# Patient Record
Sex: FEMALE | Race: WHITE | NOT HISPANIC OR LATINO | Employment: OTHER | URBAN - METROPOLITAN AREA
[De-identification: names, ages, dates, MRNs, and addresses within clinical notes are randomized per-mention and may not be internally consistent; named-entity substitution may affect disease eponyms.]

---

## 2017-05-03 ENCOUNTER — TELEPHONE (OUTPATIENT)
Dept: TRANSPLANT | Facility: CLINIC | Age: 62
End: 2017-05-03

## 2017-05-03 NOTE — TELEPHONE ENCOUNTER
Leydi's recip is going to [facility]. Didn't know if she should have clicked [facility] or [facility] since she was closer to [facility]. Will now cont process with Em.

## 2017-05-03 NOTE — TELEPHONE ENCOUNTER
"Logged in as: [email or web address]. Logout.  Incomplete   Pending   Registered   Archived Change Log Done Living Kidney Donor Evaluation Completed: May 03, 2017 09:16:27 CT Updated: May 03, 2017 09:17:13 CT  Donor Name: Leydi Atkinson MRN: [id number] Note: : 1955 Age: 61Gender: Female Donor Height: 5  4\" Weight (lb): 200 BMI: 34.3  Donor Race:  Ethnicity: Not / Donor Preferred Language: English  Required?: No Current Marital Status:   Demographics: Home Address: [address] [place] [address] City: [place] State: [place] Zip: [address] Country: [place]  Best Phone: [phone] Alt Phone: [phone] Donor Email: [email or web address] Best Phone Type: Mobile Alt Phone Type:   Preferred Contact Time(s): 09:00 AM-11:00 AM, 11:00 AM-1:00 PM, 1:00 PM-4:00 PM Preferred Contact Day(s): Mon, Tue, Wed, Thur, Fri  Donor Screen: PASSED Donor Referred by: Tx Candidate Donor self reported ABO: A  Recipient Information: Recipient Name (Last, First): Tana Currie Recipient :    1952  ... Donor Relationship: Close friend Recipient Diagnosis: Recipient ABO:   MEDICAL HISTORY:  Attention Deficit Hyperactivity Disorder (ADHD)  Depression  Endometriosis  History of miscarriage  History of pregnancy  Insomnia  Menopause  Stress Tests, Normal   Uterine Fibroids  MEDICATIONS:  Bupropion Extended-Release  Citalopram  Multivitamin with Iron  Trazodone  SURGICAL HISTORY:  Diagnostic Laparotomy/Laparoscopy, NOS  Dilation and Curettage  Myomectomy, NOS  Tubal Ligation  ALLERGIES:  Sulfa Drugs : Rash  SOCIAL HISTORY:  EtOH: Rare (1-2 drinks/month)  Illicit Drug Use: Denies  Tobacco: Denies  SELF-REPORTED FUNCTIONAL STATUS:  \"I am able to participate in moderate recreational activities like golf, double tennis, dancing, throwing a baseball or football\"  Exercise (2 X per week)  REVIEW OF ORGAN SYSTEMS: Airway or Lungs: No Blood Disorder: No Cancer: No Diabetes,Thyroid,Adrenal,Endocrine Disorder: No " Digestive or Liver: No Female Health: Yes Heart or Circulatory System: No Immune Diseases: No Kidneys and Bladder: No Muscles,Bones,Joints: No Neuro: No Psych: Yes  FAMILY HISTORY: Confirmed:  Hypertension (Father, Mother)  Denied:  Cancer (denies)  Diabetes (denies)  Heart Disease (denies)  Kidney Disease (denies)  Kidney Stones (denies)  DONOR INFORMATION:  Level of Education: Graduate or professional degree complete Employment Status: Full Time Employer: [employer] Medical Insurance Status: Has medical insurance Current Accommodation: Owns own home/apartment Living Arrangement: With relatives other than spouse, parents Allow Disclosure to Recipient: Yes Paired Kidney Exchange Education Level: Has learned about Paired Kidney Exchange Paired Kidney Exchange Participation Consent: No Donor Motivation: Highly motivated donor  HIGH RISK BEHAVIOR:  Blood transfusion < 12 months. (NO)  Commercial sex < 12 months. (NO)  Illicit IV drug use < 5yrs. (NO)  Other high risk sexual contact < 12 months. (NO)  EMERGENCY CONTACT INFORMATION:  Primary Secondary First Name: Ricardo Last Name: Magdalena Phone Number: [phone] Relationship: Friend or Other  First Name: Robb  Last Name: Jv Phone Number: [phone] Relationship: Friend or Other  REASON FOR DONATION:   A dear friend needs a kidney ASAP  PHYSICIAN CONTACT INFORMATION:  PCP  Name: [facility]: [place] State: [place]  Phone: [phone]  ADDITIONAL NOTES:   REVIEWED BY:    Ashtyn  TODAY'S DATE:   05/03/2017  ... Done  A dear friend needs a kidney ASAP

## 2017-05-27 ENCOUNTER — HEALTH MAINTENANCE LETTER (OUTPATIENT)
Age: 62
End: 2017-05-27

## 2019-09-28 ENCOUNTER — HEALTH MAINTENANCE LETTER (OUTPATIENT)
Age: 64
End: 2019-09-28

## 2021-01-10 ENCOUNTER — HEALTH MAINTENANCE LETTER (OUTPATIENT)
Age: 66
End: 2021-01-10

## 2021-10-23 ENCOUNTER — HEALTH MAINTENANCE LETTER (OUTPATIENT)
Age: 66
End: 2021-10-23

## 2022-02-12 ENCOUNTER — HEALTH MAINTENANCE LETTER (OUTPATIENT)
Age: 67
End: 2022-02-12

## 2022-02-16 ENCOUNTER — APPOINTMENT (OUTPATIENT)
Dept: MRI IMAGING | Facility: CLINIC | Age: 67
End: 2022-02-16
Attending: EMERGENCY MEDICINE
Payer: COMMERCIAL

## 2022-02-16 ENCOUNTER — HOSPITAL ENCOUNTER (EMERGENCY)
Facility: CLINIC | Age: 67
Discharge: ANOTHER HOSPITAL | End: 2022-02-17
Attending: EMERGENCY MEDICINE | Admitting: EMERGENCY MEDICINE
Payer: COMMERCIAL

## 2022-02-16 DIAGNOSIS — E83.42 HYPOMAGNESEMIA: ICD-10-CM

## 2022-02-16 DIAGNOSIS — E87.6 HYPOKALEMIA: ICD-10-CM

## 2022-02-16 DIAGNOSIS — I63.511 ACUTE STROKE DUE TO OCCLUSION OF RIGHT MIDDLE CEREBRAL ARTERY (H): ICD-10-CM

## 2022-02-16 PROBLEM — C80.1 MALIGNANT NEOPLASM (H): Status: ACTIVE | Noted: 2019-02-14

## 2022-02-16 PROBLEM — Z85.3 PERSONAL HISTORY OF MALIGNANT NEOPLASM OF BREAST: Status: ACTIVE | Noted: 2020-08-21

## 2022-02-16 LAB
ALBUMIN SERPL-MCNC: 3.5 G/DL (ref 3.5–5)
ALP SERPL-CCNC: 73 U/L (ref 45–120)
ALT SERPL W P-5'-P-CCNC: 22 U/L (ref 0–45)
ANION GAP SERPL CALCULATED.3IONS-SCNC: 11 MMOL/L (ref 5–18)
AST SERPL W P-5'-P-CCNC: 20 U/L (ref 0–40)
ATRIAL RATE - MUSE: 95 BPM
BASOPHILS # BLD AUTO: 0.1 10E3/UL (ref 0–0.2)
BASOPHILS NFR BLD AUTO: 2 %
BILIRUB SERPL-MCNC: 0.5 MG/DL (ref 0–1)
BUN SERPL-MCNC: 5 MG/DL (ref 8–22)
CALCIUM SERPL-MCNC: 8.7 MG/DL (ref 8.5–10.5)
CHLORIDE BLD-SCNC: 103 MMOL/L (ref 98–107)
CO2 SERPL-SCNC: 25 MMOL/L (ref 22–31)
CREAT SERPL-MCNC: 0.84 MG/DL (ref 0.6–1.1)
DIASTOLIC BLOOD PRESSURE - MUSE: NORMAL MMHG
EOSINOPHIL # BLD AUTO: 0 10E3/UL (ref 0–0.7)
EOSINOPHIL NFR BLD AUTO: 1 %
ERYTHROCYTE [DISTWIDTH] IN BLOOD BY AUTOMATED COUNT: 14.5 % (ref 10–15)
GFR SERPL CREATININE-BSD FRML MDRD: 77 ML/MIN/1.73M2
GLUCOSE BLD-MCNC: 105 MG/DL (ref 70–125)
HCT VFR BLD AUTO: 37.1 % (ref 35–47)
HGB BLD-MCNC: 13.2 G/DL (ref 11.7–15.7)
IMM GRANULOCYTES # BLD: 0 10E3/UL
IMM GRANULOCYTES NFR BLD: 1 %
INR PPP: 1.63 (ref 0.85–1.15)
INTERPRETATION ECG - MUSE: NORMAL
LYMPHOCYTES # BLD AUTO: 1.3 10E3/UL (ref 0.8–5.3)
LYMPHOCYTES NFR BLD AUTO: 35 %
MAGNESIUM SERPL-MCNC: 1.6 MG/DL (ref 1.8–2.6)
MCH RBC QN AUTO: 36.8 PG (ref 26.5–33)
MCHC RBC AUTO-ENTMCNC: 35.6 G/DL (ref 31.5–36.5)
MCV RBC AUTO: 103 FL (ref 78–100)
MONOCYTES # BLD AUTO: 0.6 10E3/UL (ref 0–1.3)
MONOCYTES NFR BLD AUTO: 15 %
NEUTROPHILS # BLD AUTO: 1.8 10E3/UL (ref 1.6–8.3)
NEUTROPHILS NFR BLD AUTO: 46 %
NRBC # BLD AUTO: 0 10E3/UL
NRBC BLD AUTO-RTO: 0 /100
P AXIS - MUSE: 56 DEGREES
PLATELET # BLD AUTO: 296 10E3/UL (ref 150–450)
POTASSIUM BLD-SCNC: 3.1 MMOL/L (ref 3.5–5)
PR INTERVAL - MUSE: 182 MS
PROT SERPL-MCNC: 6.9 G/DL (ref 6–8)
QRS DURATION - MUSE: 90 MS
QT - MUSE: 404 MS
QTC - MUSE: 507 MS
R AXIS - MUSE: -29 DEGREES
RBC # BLD AUTO: 3.59 10E6/UL (ref 3.8–5.2)
SODIUM SERPL-SCNC: 139 MMOL/L (ref 136–145)
SYSTOLIC BLOOD PRESSURE - MUSE: NORMAL MMHG
T AXIS - MUSE: 65 DEGREES
TROPONIN I SERPL-MCNC: 0.01 NG/ML (ref 0–0.29)
VENTRICULAR RATE- MUSE: 95 BPM
WBC # BLD AUTO: 3.9 10E3/UL (ref 4–11)

## 2022-02-16 PROCEDURE — 85025 COMPLETE CBC W/AUTO DIFF WBC: CPT | Performed by: EMERGENCY MEDICINE

## 2022-02-16 PROCEDURE — 70549 MR ANGIOGRAPH NECK W/O&W/DYE: CPT

## 2022-02-16 PROCEDURE — 83735 ASSAY OF MAGNESIUM: CPT | Performed by: EMERGENCY MEDICINE

## 2022-02-16 PROCEDURE — 99285 EMERGENCY DEPT VISIT HI MDM: CPT | Mod: 25

## 2022-02-16 PROCEDURE — 84484 ASSAY OF TROPONIN QUANT: CPT | Performed by: EMERGENCY MEDICINE

## 2022-02-16 PROCEDURE — 80053 COMPREHEN METABOLIC PANEL: CPT | Performed by: EMERGENCY MEDICINE

## 2022-02-16 PROCEDURE — 36415 COLL VENOUS BLD VENIPUNCTURE: CPT | Performed by: EMERGENCY MEDICINE

## 2022-02-16 PROCEDURE — 70544 MR ANGIOGRAPHY HEAD W/O DYE: CPT | Mod: XS

## 2022-02-16 PROCEDURE — 96375 TX/PRO/DX INJ NEW DRUG ADDON: CPT | Mod: 59

## 2022-02-16 PROCEDURE — 250N000011 HC RX IP 250 OP 636: Performed by: EMERGENCY MEDICINE

## 2022-02-16 PROCEDURE — C9803 HOPD COVID-19 SPEC COLLECT: HCPCS

## 2022-02-16 PROCEDURE — 85610 PROTHROMBIN TIME: CPT | Performed by: EMERGENCY MEDICINE

## 2022-02-16 PROCEDURE — 93005 ELECTROCARDIOGRAM TRACING: CPT | Performed by: EMERGENCY MEDICINE

## 2022-02-16 PROCEDURE — 70553 MRI BRAIN STEM W/O & W/DYE: CPT

## 2022-02-16 PROCEDURE — 250N000013 HC RX MED GY IP 250 OP 250 PS 637: Performed by: EMERGENCY MEDICINE

## 2022-02-16 PROCEDURE — 96365 THER/PROPH/DIAG IV INF INIT: CPT

## 2022-02-16 RX ORDER — MAGNESIUM SULFATE HEPTAHYDRATE 40 MG/ML
2 INJECTION, SOLUTION INTRAVENOUS ONCE
Status: COMPLETED | OUTPATIENT
Start: 2022-02-16 | End: 2022-02-17

## 2022-02-16 RX ORDER — LORAZEPAM 2 MG/ML
1 INJECTION INTRAMUSCULAR ONCE
Status: COMPLETED | OUTPATIENT
Start: 2022-02-16 | End: 2022-02-16

## 2022-02-16 RX ORDER — POTASSIUM CHLORIDE 1500 MG/1
40 TABLET, EXTENDED RELEASE ORAL ONCE
Status: COMPLETED | OUTPATIENT
Start: 2022-02-16 | End: 2022-02-16

## 2022-02-16 RX ORDER — GADOBUTROL 604.72 MG/ML
9.5 INJECTION INTRAVENOUS ONCE
Status: COMPLETED | OUTPATIENT
Start: 2022-02-17 | End: 2022-02-17

## 2022-02-16 RX ADMIN — LORAZEPAM 1 MG: 2 INJECTION INTRAMUSCULAR; INTRAVENOUS at 23:09

## 2022-02-16 RX ADMIN — POTASSIUM CHLORIDE 40 MEQ: 1500 TABLET, EXTENDED RELEASE ORAL at 23:10

## 2022-02-17 ENCOUNTER — HOSPITAL ENCOUNTER (INPATIENT)
Facility: CLINIC | Age: 67
LOS: 3 days | Discharge: HOME | DRG: 064 | End: 2022-02-20
Attending: INTERNAL MEDICINE | Admitting: STUDENT IN AN ORGANIZED HEALTH CARE EDUCATION/TRAINING PROGRAM
Payer: COMMERCIAL

## 2022-02-17 ENCOUNTER — APPOINTMENT (OUTPATIENT)
Dept: PHYSICAL THERAPY | Facility: CLINIC | Age: 67
DRG: 064 | End: 2022-02-17
Attending: STUDENT IN AN ORGANIZED HEALTH CARE EDUCATION/TRAINING PROGRAM
Payer: COMMERCIAL

## 2022-02-17 ENCOUNTER — APPOINTMENT (OUTPATIENT)
Dept: CARDIOLOGY | Facility: CLINIC | Age: 67
DRG: 064 | End: 2022-02-17
Attending: PHYSICIAN ASSISTANT
Payer: COMMERCIAL

## 2022-02-17 ENCOUNTER — APPOINTMENT (OUTPATIENT)
Dept: SPEECH THERAPY | Facility: CLINIC | Age: 67
DRG: 064 | End: 2022-02-17
Attending: STUDENT IN AN ORGANIZED HEALTH CARE EDUCATION/TRAINING PROGRAM
Payer: COMMERCIAL

## 2022-02-17 ENCOUNTER — APPOINTMENT (OUTPATIENT)
Dept: OCCUPATIONAL THERAPY | Facility: CLINIC | Age: 67
DRG: 064 | End: 2022-02-17
Attending: STUDENT IN AN ORGANIZED HEALTH CARE EDUCATION/TRAINING PROGRAM
Payer: COMMERCIAL

## 2022-02-17 ENCOUNTER — APPOINTMENT (OUTPATIENT)
Dept: CT IMAGING | Facility: CLINIC | Age: 67
DRG: 064 | End: 2022-02-17
Attending: STUDENT IN AN ORGANIZED HEALTH CARE EDUCATION/TRAINING PROGRAM
Payer: COMMERCIAL

## 2022-02-17 VITALS
SYSTOLIC BLOOD PRESSURE: 123 MMHG | BODY MASS INDEX: 36.37 KG/M2 | TEMPERATURE: 97.9 F | RESPIRATION RATE: 16 BRPM | DIASTOLIC BLOOD PRESSURE: 67 MMHG | OXYGEN SATURATION: 97 % | WEIGHT: 213 LBS | HEART RATE: 106 BPM | HEIGHT: 64 IN

## 2022-02-17 DIAGNOSIS — I63.9 ACUTE STROKE DUE TO ISCHEMIA (H): Primary | ICD-10-CM

## 2022-02-17 PROBLEM — E83.42 HYPOMAGNESEMIA: Status: ACTIVE | Noted: 2022-02-17

## 2022-02-17 PROBLEM — I63.511: Status: ACTIVE | Noted: 2022-02-17

## 2022-02-17 PROBLEM — E87.6 HYPOKALEMIA: Status: ACTIVE | Noted: 2022-02-17

## 2022-02-17 LAB
CHOLEST SERPL-MCNC: 178 MG/DL
GLUCOSE BLDC GLUCOMTR-MCNC: 103 MG/DL (ref 70–99)
GLUCOSE BLDC GLUCOMTR-MCNC: 90 MG/DL (ref 70–99)
GLUCOSE BLDC GLUCOMTR-MCNC: 91 MG/DL (ref 70–99)
GLUCOSE BLDC GLUCOMTR-MCNC: 92 MG/DL (ref 70–99)
GLUCOSE BLDC GLUCOMTR-MCNC: 95 MG/DL (ref 70–99)
HBA1C MFR BLD: 5 % (ref 0–5.6)
HDLC SERPL-MCNC: 31 MG/DL
LDLC SERPL CALC-MCNC: 113 MG/DL
LVEF ECHO: NORMAL
MAGNESIUM SERPL-MCNC: 2.1 MG/DL (ref 1.8–2.6)
MAGNESIUM SERPL-MCNC: 2.4 MG/DL (ref 1.6–2.3)
NONHDLC SERPL-MCNC: 147 MG/DL
POTASSIUM BLD-SCNC: 2.9 MMOL/L (ref 3.4–5.3)
POTASSIUM BLD-SCNC: 3.8 MMOL/L (ref 3.4–5.3)
SARS-COV-2 RNA RESP QL NAA+PROBE: NEGATIVE
TRIGL SERPL-MCNC: 168 MG/DL
TROPONIN I SERPL HS-MCNC: 8 NG/L

## 2022-02-17 PROCEDURE — 99223 1ST HOSP IP/OBS HIGH 75: CPT | Performed by: PSYCHIATRY & NEUROLOGY

## 2022-02-17 PROCEDURE — 36415 COLL VENOUS BLD VENIPUNCTURE: CPT | Performed by: HOSPITALIST

## 2022-02-17 PROCEDURE — 93306 TTE W/DOPPLER COMPLETE: CPT

## 2022-02-17 PROCEDURE — 97112 NEUROMUSCULAR REEDUCATION: CPT | Mod: GP | Performed by: PHYSICAL THERAPIST

## 2022-02-17 PROCEDURE — 250N000013 HC RX MED GY IP 250 OP 250 PS 637: Performed by: HOSPITALIST

## 2022-02-17 PROCEDURE — 70498 CT ANGIOGRAPHY NECK: CPT

## 2022-02-17 PROCEDURE — 84484 ASSAY OF TROPONIN QUANT: CPT | Performed by: STUDENT IN AN ORGANIZED HEALTH CARE EDUCATION/TRAINING PROGRAM

## 2022-02-17 PROCEDURE — 120N000001 HC R&B MED SURG/OB

## 2022-02-17 PROCEDURE — 250N000009 HC RX 250: Performed by: HOSPITALIST

## 2022-02-17 PROCEDURE — 83735 ASSAY OF MAGNESIUM: CPT | Performed by: HOSPITALIST

## 2022-02-17 PROCEDURE — 250N000013 HC RX MED GY IP 250 OP 250 PS 637: Performed by: PHYSICIAN ASSISTANT

## 2022-02-17 PROCEDURE — 97166 OT EVAL MOD COMPLEX 45 MIN: CPT | Mod: GO | Performed by: OCCUPATIONAL THERAPIST

## 2022-02-17 PROCEDURE — 36415 COLL VENOUS BLD VENIPUNCTURE: CPT | Performed by: STUDENT IN AN ORGANIZED HEALTH CARE EDUCATION/TRAINING PROGRAM

## 2022-02-17 PROCEDURE — 87635 SARS-COV-2 COVID-19 AMP PRB: CPT | Performed by: EMERGENCY MEDICINE

## 2022-02-17 PROCEDURE — 83735 ASSAY OF MAGNESIUM: CPT | Performed by: STUDENT IN AN ORGANIZED HEALTH CARE EDUCATION/TRAINING PROGRAM

## 2022-02-17 PROCEDURE — 999N000208 ECHOCARDIOGRAM COMPLETE

## 2022-02-17 PROCEDURE — 250N000011 HC RX IP 250 OP 636: Performed by: EMERGENCY MEDICINE

## 2022-02-17 PROCEDURE — 92610 EVALUATE SWALLOWING FUNCTION: CPT | Mod: GN | Performed by: SPEECH-LANGUAGE PATHOLOGIST

## 2022-02-17 PROCEDURE — 255N000002 HC RX 255 OP 636: Performed by: EMERGENCY MEDICINE

## 2022-02-17 PROCEDURE — 97535 SELF CARE MNGMENT TRAINING: CPT | Mod: GO | Performed by: OCCUPATIONAL THERAPIST

## 2022-02-17 PROCEDURE — 84132 ASSAY OF SERUM POTASSIUM: CPT | Performed by: STUDENT IN AN ORGANIZED HEALTH CARE EDUCATION/TRAINING PROGRAM

## 2022-02-17 PROCEDURE — 93306 TTE W/DOPPLER COMPLETE: CPT | Mod: 26 | Performed by: INTERNAL MEDICINE

## 2022-02-17 PROCEDURE — 97161 PT EVAL LOW COMPLEX 20 MIN: CPT | Mod: GP | Performed by: PHYSICAL THERAPIST

## 2022-02-17 PROCEDURE — 92526 ORAL FUNCTION THERAPY: CPT | Mod: GN | Performed by: SPEECH-LANGUAGE PATHOLOGIST

## 2022-02-17 PROCEDURE — 80061 LIPID PANEL: CPT | Performed by: STUDENT IN AN ORGANIZED HEALTH CARE EDUCATION/TRAINING PROGRAM

## 2022-02-17 PROCEDURE — 97116 GAIT TRAINING THERAPY: CPT | Mod: GP | Performed by: PHYSICAL THERAPIST

## 2022-02-17 PROCEDURE — 83036 HEMOGLOBIN GLYCOSYLATED A1C: CPT | Performed by: STUDENT IN AN ORGANIZED HEALTH CARE EDUCATION/TRAINING PROGRAM

## 2022-02-17 PROCEDURE — A9585 GADOBUTROL INJECTION: HCPCS | Performed by: EMERGENCY MEDICINE

## 2022-02-17 PROCEDURE — 84132 ASSAY OF SERUM POTASSIUM: CPT | Performed by: HOSPITALIST

## 2022-02-17 PROCEDURE — 70496 CT ANGIOGRAPHY HEAD: CPT

## 2022-02-17 PROCEDURE — 250N000013 HC RX MED GY IP 250 OP 250 PS 637: Performed by: EMERGENCY MEDICINE

## 2022-02-17 PROCEDURE — 250N000011 HC RX IP 250 OP 636: Performed by: HOSPITALIST

## 2022-02-17 PROCEDURE — 99223 1ST HOSP IP/OBS HIGH 75: CPT | Mod: AI | Performed by: STUDENT IN AN ORGANIZED HEALTH CARE EDUCATION/TRAINING PROGRAM

## 2022-02-17 RX ORDER — ONDANSETRON 4 MG/1
4 TABLET, ORALLY DISINTEGRATING ORAL EVERY 6 HOURS PRN
Status: DISCONTINUED | OUTPATIENT
Start: 2022-02-17 | End: 2022-02-20 | Stop reason: HOSPADM

## 2022-02-17 RX ORDER — POTASSIUM CHLORIDE 1500 MG/1
20 TABLET, EXTENDED RELEASE ORAL DAILY
COMMUNITY

## 2022-02-17 RX ORDER — LETROZOLE 2.5 MG/1
2.5 TABLET, FILM COATED ORAL DAILY
COMMUNITY

## 2022-02-17 RX ORDER — GABAPENTIN 300 MG/1
300 CAPSULE ORAL 2 TIMES DAILY
COMMUNITY

## 2022-02-17 RX ORDER — POTASSIUM CHLORIDE 1500 MG/1
20 TABLET, EXTENDED RELEASE ORAL DAILY
Status: ON HOLD | COMMUNITY
End: 2022-02-17

## 2022-02-17 RX ORDER — POTASSIUM CHLORIDE 1.5 G/1.58G
40 POWDER, FOR SOLUTION ORAL ONCE
Status: COMPLETED | OUTPATIENT
Start: 2022-02-17 | End: 2022-02-17

## 2022-02-17 RX ORDER — CLOPIDOGREL BISULFATE 75 MG/1
300 TABLET ORAL ONCE
Status: COMPLETED | OUTPATIENT
Start: 2022-02-17 | End: 2022-02-17

## 2022-02-17 RX ORDER — CLOPIDOGREL BISULFATE 75 MG/1
75 TABLET ORAL DAILY
Status: DISCONTINUED | OUTPATIENT
Start: 2022-02-18 | End: 2022-02-19

## 2022-02-17 RX ORDER — POTASSIUM CHLORIDE 7.45 MG/ML
10 INJECTION INTRAVENOUS
Status: DISCONTINUED | OUTPATIENT
Start: 2022-02-17 | End: 2022-02-17

## 2022-02-17 RX ORDER — ONDANSETRON 2 MG/ML
4 INJECTION INTRAMUSCULAR; INTRAVENOUS EVERY 6 HOURS PRN
Status: DISCONTINUED | OUTPATIENT
Start: 2022-02-17 | End: 2022-02-20 | Stop reason: HOSPADM

## 2022-02-17 RX ORDER — POTASSIUM CHLORIDE 1.5 G/1.58G
20 POWDER, FOR SOLUTION ORAL ONCE
Status: COMPLETED | OUTPATIENT
Start: 2022-02-17 | End: 2022-02-17

## 2022-02-17 RX ORDER — LIDOCAINE 40 MG/G
CREAM TOPICAL
Status: DISCONTINUED | OUTPATIENT
Start: 2022-02-17 | End: 2022-02-20 | Stop reason: HOSPADM

## 2022-02-17 RX ORDER — HYDROXYZINE HYDROCHLORIDE 25 MG/1
25 TABLET, FILM COATED ORAL
Status: DISCONTINUED | OUTPATIENT
Start: 2022-02-17 | End: 2022-02-20 | Stop reason: HOSPADM

## 2022-02-17 RX ORDER — BUPROPION HYDROCHLORIDE 150 MG/1
150 TABLET ORAL DAILY PRN
COMMUNITY

## 2022-02-17 RX ORDER — ATORVASTATIN CALCIUM 80 MG/1
80 TABLET, FILM COATED ORAL EVERY EVENING
Status: DISCONTINUED | OUTPATIENT
Start: 2022-02-17 | End: 2022-02-20 | Stop reason: HOSPADM

## 2022-02-17 RX ORDER — ASPIRIN 81 MG/1
81 TABLET ORAL DAILY
Status: DISCONTINUED | OUTPATIENT
Start: 2022-02-17 | End: 2022-02-19

## 2022-02-17 RX ORDER — LANOLIN ALCOHOL/MO/W.PET/CERES
3 CREAM (GRAM) TOPICAL
Status: DISCONTINUED | OUTPATIENT
Start: 2022-02-17 | End: 2022-02-20 | Stop reason: HOSPADM

## 2022-02-17 RX ORDER — TRAZODONE HYDROCHLORIDE 100 MG/1
100 TABLET ORAL
COMMUNITY

## 2022-02-17 RX ORDER — VENLAFAXINE HYDROCHLORIDE 75 MG/1
150 CAPSULE, EXTENDED RELEASE ORAL DAILY
COMMUNITY

## 2022-02-17 RX ORDER — ASPIRIN 325 MG
325 TABLET ORAL ONCE
Status: COMPLETED | OUTPATIENT
Start: 2022-02-17 | End: 2022-02-17

## 2022-02-17 RX ORDER — IOPAMIDOL 755 MG/ML
70 INJECTION, SOLUTION INTRAVASCULAR ONCE
Status: COMPLETED | OUTPATIENT
Start: 2022-02-17 | End: 2022-02-17

## 2022-02-17 RX ADMIN — MAGNESIUM SULFATE HEPTAHYDRATE 2 G: 40 INJECTION, SOLUTION INTRAVENOUS at 00:47

## 2022-02-17 RX ADMIN — GADOBUTROL 9.5 ML: 604.72 INJECTION INTRAVENOUS at 00:16

## 2022-02-17 RX ADMIN — ASPIRIN 325 MG ORAL TABLET 325 MG: 325 PILL ORAL at 01:36

## 2022-02-17 RX ADMIN — IOPAMIDOL 70 ML: 755 INJECTION, SOLUTION INTRAVENOUS at 08:30

## 2022-02-17 RX ADMIN — POTASSIUM CHLORIDE 40 MEQ: 1.5 POWDER, FOR SOLUTION ORAL at 10:53

## 2022-02-17 RX ADMIN — ATORVASTATIN CALCIUM 80 MG: 80 TABLET, FILM COATED ORAL at 20:35

## 2022-02-17 RX ADMIN — ASPIRIN 81 MG: 81 TABLET, COATED ORAL at 13:23

## 2022-02-17 RX ADMIN — POTASSIUM CHLORIDE 20 MEQ: 1.5 POWDER, FOR SOLUTION ORAL at 13:22

## 2022-02-17 RX ADMIN — CLOPIDOGREL BISULFATE 300 MG: 75 TABLET ORAL at 13:23

## 2022-02-17 RX ADMIN — SODIUM CHLORIDE 90 ML: 900 INJECTION INTRAVENOUS at 08:30

## 2022-02-17 ASSESSMENT — ACTIVITIES OF DAILY LIVING (ADL)
ADLS_ACUITY_SCORE: 12
PREVIOUS_RESPONSIBILITIES: DRIVING;MEAL PREP;HOUSEKEEPING;LAUNDRY;SHOPPING;MEDICATION MANAGEMENT;FINANCES
ADLS_ACUITY_SCORE: 12

## 2022-02-17 NOTE — PROGRESS NOTES
[facility]    Medicine Progress Note - Hospitalist Service    Date of Admission:  2/17/2022    Assessment & Plan     Assessment & Plan     Leydi Atkinson is a 66 year old female with past medical history significant for breast cancer and prior pulmonary embolism. admitted on 2/17/2022 with acute stroke.      Acute ischemic stroke of the right MCA territory  Suspected right ICA dissection  Pt presented to the ED with >24 hours of slurred speech, balance issues and left sided facial droop. MRI obtained and showed R superior MCA acute infarction. MRA of the neck showed long segment stenosis of the R ICA from its origin to the base of the skill and MRA of the brain showed R distal MCA occlusion.  -- Stroke consulted - appreciated  --Recommendation for aspirin Plavix for the next 2 to 4 days due to the size of the stroke.  --CT chest abdomen and pelvis to look for evidence of malignancy.  --Echo with bubble study  --Neurology recommendations to consult vascular medicine due to patient having a history of DVT PE on Xarelto, now presenting with stroke.  Asking for any further hypercoagulability work-up that needs to be done.  --Continue atorvastatin 80 mg.  -- Continue cardiac monitor for 30 days on discharge.   -- Neurochecks and Vital Signs every 4 hours   --Plan on PTA Xarelto for now per neuro recs.      Hypokalemia  Hypomagnesemia   Potassium 3.1, magnesium 1.6 on admission.   - Replace per protocol    Elevated INR  INR = 1.63 (Ref range: 0.85 - 1.15) Possibly nutritional.   -- will consider giving vitamin K if not improving.   - Monitor      Hx of breast cancer   T1c, N3, M0 infiltrating ductal carcinoma of the right breast, ER/AL positive, her-2 nicolas negative. SP mastectomy, chemotherapy and radiation treatments. She is currently enrolled in the JOEL trial.  - She remains on Letrozole 2.5mg daily and study drug (ribociclib) 400mg BID - hold while admitted to the hospital.      Hx of  "pulmonary Embolism (4/2021)   - Continue Rivaroxaban 20mg daily     Hx of Monoclonal B cell lymphocytosis, lambda restricted.   Extensive lab testing at [facility] detected 3% monoclonal B cell lymphocyte  - Not currently on any treatment for this.      Obesity: Estimated body mass index is 36.56 kg/m  as calculated from the following:    Height as of 2/16/22: 1.626 m (5' 4\").    Weight as of 2/16/22: 96.6 kg (213 lb)       Diet: Regular Diet Adult    DVT Prophylaxis: Pneumatic Compression Devices  Vera Catheter: Not present  Central Lines: None  Cardiac Monitoring: ACTIVE order. Indication: Stroke, acute (48 hours)  Code Status: Full Code      Disposition Plan   Expected Discharge: 02/18/2022     Anticipated discharge location:  Awaiting care coordination huddle  Delays:            The patient's care was discussed with the Patient.    Gail Livingston MD  Hospitalist Service  [facility]  Securely message with the Vocera Web Console (learn more here)  Text page via [facility] Paging/Directory         Clinically Significant Risk Factors Present on Admission        # Hypokalemia: K = 2.9 mmol/L (Ref range: 3.4 - 5.3 mmol/L) on admission, will replace as needed       # Coagulation Defect: home medication list includes an anticoagulant medication    # Obesity: Estimated body mass index is 36.56 kg/m  as calculated from the following:    Height as of 2/16/22: 1.626 m (5' 4\").    Weight as of 2/16/22: 96.6 kg (213 lb).      ______________________________________________________________________    Interval History   No complaints    Data reviewed today: I reviewed all medications, new labs and imaging results over the last 24 hours. I personally reviewed no images or EKG's today.    Physical Exam   Vital Signs: Temp: 98.6  F (37  C) Temp src: Oral BP: 109/53 Pulse: 91   Resp: 16 SpO2: 95 % O2 Device: None (Room air)    Weight: 0 lbs 0 oz  General Appearance: Well appearing for stated age.  Respiratory: " CTAB, no rales or ronchi  Cardiovascular: S1, S2 normal, no murmurs  GI: non-tender on palpation, BS present  Neuro:     Data   Recent Labs   Lab 02/17/22  1147 02/17/22  0846 02/17/22  0634 02/17/22  0435 02/16/22  2204   WBC  --   --   --   --  3.9*   HGB  --   --   --   --  13.2   MCV  --   --   --   --  103*   PLT  --   --   --   --  296   INR  --   --   --   --  1.63*   NA  --   --   --   --  139   POTASSIUM  --   --   --  2.9* 3.1*   CHLORIDE  --   --   --   --  103   CO2  --   --   --   --  25   BUN  --   --   --   --  5*   CR  --   --   --   --  0.84   ANIONGAP  --   --   --   --  11   NATALIE  --   --   --   --  8.7   GLC 92 95 91  --  105   ALBUMIN  --   --   --   --  3.5   PROTTOTAL  --   --   --   --  6.9   BILITOTAL  --   --   --   --  0.5   ALKPHOS  --   --   --   --  73   ALT  --   --   --   --  22   AST  --   --   --   --  20

## 2022-02-17 NOTE — ED PROVIDER NOTES
EMERGENCY DEPARTMENT ENCOUNTER      NAME: Leydi Atkinson  AGE: 66 year old female  YOB: 1955  MRN: [id number]  EVALUATION DATE & TIME: No admission date for patient encounter.    [facility]    ED PROVIDER: Neymar Collins M.D.      Chief Complaint   Patient presents with     Stroke Symptoms         FINAL IMPRESSION:  Acute right MCA stroke  Hypokalemia  Hypomagnesemia    ED COURSE & MEDICAL DECISION MAKING:    Pertinent Labs & Imaging studies reviewed. (See chart for details)  66 year old female presents to the Emergency Department for evaluation of possible stroke symptoms.  Patient with underlying history of breast cancer, pulmonary embolism and chronic anticoagulation.  Per report of family members speech was off last night (Tuesday).  Patient also reports having trouble with balance yesterday.  When they went to visit her today they noticed immediately that something was wrong and brought her in here over concerns of stroke.  Patient does have some slight slurring/dysarthria.  Obvious left facial droop also noted.  Remainder of exam however is quite reassuring.  She may have minimal ataxia.  But Romberg is negative.  Finger-nose-finger is negative.  No ulnar drift.  Given length of symptoms we will proceed with MRI/MRA.  Baseline blood work also being obtained.  Patient appears non toxic with stable vitals signs.     9:42 PM I met with the patient for the initial interview and physical examination. Discussed plan for treatment and workup in the ED.    10:14 PM.  When compared to prior tracing no significant changes other than resolution of prior nonspecific T wave changes.  10:24 PM.  Patient requesting something for anxiety prior to MRI.  Ativan x1 mg intravenously ordered  10:32 PM.  Laboratory evaluation with minimal hypokalemia and hypomagnesemia.  Supplementation will be given.  INR slightly elevated 1.63.  Patient with slight leukopenia and macrocytic indices.   MAC sedation is not available after 1530, so I have rescheduled pt to 1-18-19 at 1200, MAC sedation, Dr. Light. Everything else is the same.   Pt agreeable.  New letter mailed.   Intravenous magnesium supplementation and oral potassium supplementation ordered  12:44 AM I rechecked and updated the patient.  Preliminary review of MRI indicating right hemispheric injury  1:04 AM I spoke with Dr. Hadley, [facility]. He reports that the patient has a distal right M1 occlusion.   1:11 AM I rechecked and updated the patient.  Patient initiated on full dose aspirin.  We will discuss potential further antiplatelet therapy with neurology/admitting physician.  1:43 AM I spoke with Dr. Cifuentes, Stroke Neurology at [facility]. He recommends the patient get a head CTA when she arrives at [facility].   1:51 AM I rechecked and updated the patient.  Patient did verify that she is taking her Xarelto 20 mg daily.  2:00 AM I discussed the case with [facility] hospitalist, Dr Joel, who accepts the patient.  The need for CTA discussed.    At the conclusion of the encounter I discussed the results of all of the tests and the disposition. The questions were answered and return precautions provided. The patient or family acknowledged understanding and was agreeable with the care plan.       Patient represents critical care situation.  Approximately 40 minutes spent direct involved patient care independent of any procedures.        PPE: Provider wore gloves, N95 mask, eye protection, and surgical cap.     MEDICATIONS GIVEN IN THE EMERGENCY:  Medications   LORazepam (ATIVAN) injection 1 mg (1 mg Intravenous Given 2/16/22 2309)   potassium chloride ER (KLOR-CON M) CR tablet 40 mEq (40 mEq Oral Given 2/16/22 2310)   magnesium sulfate 2 g in water intermittent infusion (2 g Intravenous New Bag 2/17/22 0047)   gadobutrol (GADAVIST) injection 9.5 mL (9.5 mLs Intravenous Given 2/17/22 0016)   aspirin (ASA) tablet 325 mg (325 mg Oral Given 2/17/22 0136)       NEW PRESCRIPTIONS STARTED AT TODAY'S ER VISIT  New Prescriptions    No medications on file       "    =================================================================    HPI    Patient information was obtained from: the patient and her daughter    Use of Intrepreter: N/A         Leydi Atkinson is a 66 year old female with a pertient medical history of breast cancer (in remission), anxiety, and hypercholesterolemia who presents to the ED for evaluation of slurred speech and left sided facial droop.     The patient reports the onset of continuous unsteadiness on her feet as well as an intermittent \"sinus\" headache yesterday (2/15). She states that she recently got over the stomach flu, and has been vomiting recently. She denies a history of stroke. She is on Xarelto at this time. The patient denies diarrhea, and any other symptoms or complaints at this time.     Per the patient's daughter: The patient's daughter spoke with the patient on the phone yesterday and noted that the patient sounded off. Today when she visited the patient around one hour ago (~1830) she noticed the patient had slurred and slower speech. She states that \"as soon as I saw her I knew something was wrong.\"    REVIEW OF SYSTEMS   Constitutional:  Denies fever, chills  Respiratory:  Denies productive cough or increased work of breathing  Cardiovascular:  Denies chest pain, palpitations  GI:  Denies abdominal pain, nausea, or change in bowel or bladder habits. Endorses vomiting (resolved)  Musculoskeletal:  Denies any new muscle/joint swelling  Skin:  Denies rash   Neurologic:  Denies focal weakness. Endorses slurred speech, headache, gait change  All systems negative except as marked.     PAST MEDICAL HISTORY:  Past Medical History:   Diagnosis Date     Asthma      Depression        PAST SURGICAL HISTORY:  Past Surgical History:   Procedure Laterality Date     LAPAROSCOPIC REMOVAL VAGAL NERVE BLOCKER  9/16/2013    Procedure: LAPAROSCOPIC REMOVAL VAGAL NERVE BLOCKER;  Laparoscopic Removal Of Vagal Nerve Leads And Subcutaneous Neuromodulator ; "  Surgeon: Sigifredo Bernabe MD;  Location: [facility] OR     TUBAL LIGATION       uterine fibroids[       VBLOC[           CURRENT MEDICATIONS:    No current facility-administered medications for this encounter.    Current Outpatient Medications:      albuterol (2.5 MG/3ML) 0.083% nebulizer solution, Take 1 ampule by nebulization every 6 hours as needed., Disp: , Rfl:      albuterol (PROAIR HFA) 108 (90 BASE) MCG/ACT inhaler, Inhale 2 puffs into the lungs every 6 hours as needed., Disp: , Rfl:      citalopram (CELEXA) 40 MG tablet, Take 40 mg by mouth daily, Disp: , Rfl:      Phentermine-Topiramate 11.25-69 MG CP24, Take 1 capsule by mouth daily NEEDS APPOINTMENT, Disp: 30 capsule, Rfl: 0     SIMVASTATIN PO, Take 40 mg by mouth daily, Disp: , Rfl:     Facility-Administered Medications Ordered in Other Encounters:      glycopyrrolate (ROBINUL) injection, , , PRN, Dain Bolanos APRN CRNA, 1 mg at 09/16/13 1315     neostigmine (PROSTIGMINE) injection, , Intravenous, PRN, Dain Bolanos APRN CRNA, 5 mg at 09/16/13 1315    ALLERGIES:  Allergies   Allergen Reactions     Sulfa Drugs        FAMILY HISTORY:  History reviewed. No pertinent family history.    SOCIAL HISTORY:   Social History     Socioeconomic History     Marital status: Unknown     Spouse name: Not on file     Number of children: Not on file     Years of education: Not on file     Highest education level: Not on file   Occupational History     Not on file   Tobacco Use     Smoking status: Never Smoker     Smokeless tobacco: Not on file   Substance and Sexual Activity     Alcohol use: Yes     Comment: Mild     Drug use: Not on file     Sexual activity: Not on file   Other Topics Concern     Parent/sibling w/ CABG, MI or angioplasty before 65F 55M? Not Asked   Social History Narrative     Not on file     Social Determinants of Health     Financial Resource Strain: Not on file   Food Insecurity: Not on file   Transportation Needs: Not on file   Physical  "Activity: Not on file   Stress: Not on file   Social Connections: Not on file   Intimate Partner Violence: Not on file   Housing Stability: Not on file       VITALS:  Patient Vitals for the past 24 hrs:   BP Temp Temp src Pulse Resp SpO2 Height Weight   02/17/22 0137 120/56 -- -- 101 16 95 % -- --   02/17/22 0045 123/74 -- -- 97 16 99 % -- --   02/16/22 2222 -- -- -- -- -- -- -- 96.6 kg (213 lb)   02/16/22 2144 (!) 155/104 97.9  F (36.6  C) Oral 114 18 99 % 1.626 m (5' 4\") 101.6 kg (224 lb)        PHYSICAL EXAM    Constitutional:  Awake, alert, in mild apparent distress  HENT:  Normocephalic, Atraumatic. Bilateral external ears normal. Oropharynx moist. Nose normal. Neck- Normal range of motion with no guarding, No midline cervical tenderness, Supple, No stridor.  Slight facial asymmetry with left facial droop eyes:  PERRL, EOMI with no signs of entrapment, Conjunctiva normal, No discharge.  Minimal nystagmus with left lateral gaze  Respiratory:  Normal breath sounds, No respiratory distress, No wheezing.    Cardiovascular:  Normal heart rate, Normal rhythm, No appreciable rubs or gallops.   GI:  Soft, No tenderness, No distension, No palpable masses  Musculoskeletal:  Intact distal pulses, No edema. Good range of motion in all major joints. No tenderness to palpation or major deformities noted.  Integument:  Warm, Dry, No erythema, No rash.   Neurologic:  Alert & oriented, Normal sensory function. Mild dysarthria. Slight left sided facial droop.  Finger-nose-finger negative.  Rapid altering movements negative.  Romberg negative.  No ulnar drift.  Psychiatric:  Affect normal, Judgment normal, Mood normal.     LAB:  All pertinent labs reviewed and interpreted.  Results for orders placed or performed during the hospital encounter of 02/16/22   MR Brain w/o & w Contrast    Impression    IMPRESSION:  1.  Right MCA territory acute/subacute infarction without hemorrhagic conversion. Mild associated mass effect is present, " however there is no midline shift. No associated hemorrhage.  2.  Additional findings above.            Dr. Dyllan Hadley discussed results with Dr. ROSEANNA Collins on 2/17/2022 12:55 AM.   MRA Brain ([place] [facility] [place]) wo Contrast    Impression    IMPRESSION:  1.  Distal right M1 middle cerebral artery occlusion, as above.        Dr. Dyllan Hadley discussed results with Dr. ROSEANNA Collins on 2/17/2022 12:55 AM.   MRA Neck (Carotids) wo & w Contrast    Impression    IMPRESSION:  1.  No high-grade stenosis or dissection.  2.  Mild diffuse narrowing of the right common carotid artery and right cervical ICA.  3.  Additional findings above.   Comprehensive metabolic panel   Result Value Ref Range    Sodium 139 136 - 145 mmol/L    Potassium 3.1 (L) 3.5 - 5.0 mmol/L    Chloride 103 98 - 107 mmol/L    Carbon Dioxide (CO2) 25 22 - 31 mmol/L    Anion Gap 11 5 - 18 mmol/L    Urea Nitrogen 5 (L) 8 - 22 mg/dL    Creatinine 0.84 0.60 - 1.10 mg/dL    Calcium 8.7 8.5 - 10.5 mg/dL    Glucose 105 70 - 125 mg/dL    Alkaline Phosphatase 73 45 - 120 U/L    AST 20 0 - 40 U/L    ALT 22 0 - 45 U/L    Protein Total 6.9 6.0 - 8.0 g/dL    Albumin 3.5 3.5 - 5.0 g/dL    Bilirubin Total 0.5 0.0 - 1.0 mg/dL    GFR Estimate 77 >60 mL/min/1.73m2   Result Value Ref Range    Magnesium 1.6 (L) 1.8 - 2.6 mg/dL   Result Value Ref Range    INR 1.63 (H) 0.85 - 1.15   Troponin I (now)   Result Value Ref Range    Troponin I 0.01 0.00 - 0.29 ng/mL   CBC with platelets and differential   Result Value Ref Range    WBC Count 3.9 (L) 4.0 - 11.0 10e3/uL    RBC Count 3.59 (L) 3.80 - 5.20 10e6/uL    Hemoglobin 13.2 11.7 - 15.7 g/dL    Hematocrit 37.1 35.0 - 47.0 %     (H) 78 - 100 fL    MCH 36.8 (H) 26.5 - 33.0 pg    MCHC 35.6 31.5 - 36.5 g/dL    RDW 14.5 10.0 - 15.0 %    Platelet Count 296 150 - 450 10e3/uL    % Neutrophils 46 %    % Lymphocytes 35 %    % Monocytes 15 %    % Eosinophils 1 %    % Basophils 2 %    % Immature Granulocytes 1 %    NRBCs per 100 WBC 0  <1 /100    Absolute Neutrophils 1.8 1.6 - 8.3 10e3/uL    Absolute Lymphocytes 1.3 0.8 - 5.3 10e3/uL    Absolute Monocytes 0.6 0.0 - 1.3 10e3/uL    Absolute Eosinophils 0.0 0.0 - 0.7 10e3/uL    Absolute Basophils 0.1 0.0 - 0.2 10e3/uL    Absolute Immature Granulocytes 0.0 <=0.4 10e3/uL    Absolute NRBCs 0.0 10e3/uL   ECG 12-LEAD WITH MUSE ([facility])   Result Value Ref Range    Systolic Blood Pressure  mmHg    Diastolic Blood Pressure  mmHg    Ventricular Rate 95 BPM    Atrial Rate 95 BPM    TN Interval 182 ms    QRS Duration 90 ms     ms    QTc 507 ms    P Axis 56 degrees    R AXIS -29 degrees    T Axis 65 degrees    Interpretation ECG       Sinus rhythm  Nonspecific T wave abnormality  Abnormal ECG  When compared with ECG of 16-SEP-2013 08:10,  Nonspecific T wave abnormality no longer evident in Inferior leads  T wave inversion no longer evident in Lateral leads  QT has lengthened  Confirmed by SEE ED PROVIDER NOTE FOR, ECG INTERPRETATION ([id number]),  JENI RIVERA ([id number]) on 2/16/2022 10:15:17 PM         RADIOLOGY:  Reviewed all pertinent imaging. Please see official radiology report.  MRA Neck (Carotids) wo & w Contrast   Final Result   IMPRESSION:   1.  No high-grade stenosis or dissection.   2.  Mild diffuse narrowing of the right common carotid artery and right cervical ICA.   3.  Additional findings above.      MRA Brain ([place] [facility] [place]) wo Contrast   Final Result   IMPRESSION:   1.  Distal right M1 middle cerebral artery occlusion, as above.            Dr. Dyllan Hadley discussed results with Dr. ROSEANNA Collins on 2/17/2022 12:55 AM.      MR Brain w/o & w Contrast   Final Result   IMPRESSION:   1.  Right MCA territory acute/subacute infarction without hemorrhagic conversion. Mild associated mass effect is present, however there is no midline shift. No associated hemorrhage.   2.  Additional findings above.                  Dr. Dyllan Hadley discussed results with Dr. ORSEANNA Collins on 2/17/2022 12:55 AM.           EKG:    Normal sinus rhythm.  Rate of 95.  Normal QRS.  Nonspecific ST segment flattening lateral leads.  Prior inverted/T wave abnormalities resolved compared to tracing September 16, 2013  I have independently reviewed and interpreted the EKG(s) documented above.    PROCEDURES:   National Institutes of Health Stroke Scale  Exam Interval:   Score    Level of consciousness: 0    LOC questions: 0    LOC commands: 0    Best gaze: 0    Visual: 0    Facial palsy: 1    Motor arm (left): 0    Motor arm (right): 0    Motor leg (left): 0    Motor leg (right): 0    Limb ataxia: 0    Sensory: 0    Best language: 1    Dysarthria: 1    Extinction and inattention: 0        Total Score: 3           I, Kerry Govea, am serving as a scribe to document services personally performed by Neymar Collins MD, based on my observation and the provider's statements to me. I, Neymar Collins MD attest that Kerry Jeferson is acting in a scribe capacity, has observed my performance of the services and has documented them in accordance with my direction.    Neymar Collins M.D.  Emergency Medicine  [facility] EMERGENCY ROOM      Neymar Collins MD  02/17/22 0205

## 2022-02-17 NOTE — ED TRIAGE NOTES
Pt presents with slurred speech & slight lt facial droop, no unilateral weakness  Daughter reports noting slurred speech yesterday when spoke with mother on phone  Facial droop was noted this pm around 8:30 pm  Provider in triage to evaluate for stroke code

## 2022-02-17 NOTE — PHARMACY-ADMISSION MEDICATION HISTORY
Pharmacy Medication History  Admission medication history interview status for the 2/17/2022  admission is complete. See EPIC admission navigator for prior to admission medications     Location of Interview: Patient room  Medication history sources: Patient    Significant changes made to the medication list:  Added all medications  Removed citalopram, phentermine-topiramate, simvastatin    In the past week, patient estimated taking medication this percent of the time: greater than 90%    Additional medication history information:   Patient is taking an investigational study drug, ribociclib.    Drug study name : JOEL study    Medication reconciliation completed by provider prior to medication history? No    Time spent in this activity: 15 minutes    Prior to Admission medications    Medication Sig Last Dose Taking? Auth Provider   albuterol (2.5 MG/3ML) 0.083% nebulizer solution Take 1 ampule by nebulization every 6 hours as needed. PRN Yes Reported, Patient   albuterol (PROAIR HFA) 108 (90 BASE) MCG/ACT inhaler Inhale 2 puffs into the lungs every 6 hours as needed. PRN Yes Reported, Patient   buPROPion (WELLBUTRIN XL) 150 MG 24 hr tablet Take 150 mg by mouth daily as needed (fatigue) PRN Yes Unknown, Entered By History   gabapentin (NEURONTIN) 300 MG capsule Take 300 mg by mouth 2 times daily 2/16/2022 at Unknown time Yes Unknown, Entered By History   letrozole (FEMARA) 2.5 MG tablet Take 2.5 mg by mouth daily 2/16/2022 at potassium  Yes Unknown, Entered By History   NONFORMULARY Investigational drug name : ribociclib 200mg tablet     Drug study name : JOEL study    Take 400 mg by mouth daily. Take 2 tabs (400mg) by mouth daily in the morning with 8oz glass of water for 21 days.  Then off 7 days 2/16/2022 at Unknown time Yes Unknown, Entered By History   potassium chloride ER (KLOR-CON M) 20 MEQ CR tablet Take 20 mEq by mouth daily 2/16/2022 at Unknown time Yes Unknown, Entered By History   rivaroxaban  ANTICOAGULANT (XARELTO) 20 MG TABS tablet Take 20 mg by mouth daily (with dinner) 2/16/2022 at Unknown time Yes Unknown, Entered By History   traZODone (DESYREL) 100 MG tablet Take 100 mg by mouth nightly as needed for sleep PRN Yes Unknown, Entered By History   venlafaxine (EFFEXOR-XR) 75 MG 24 hr capsule Take 150 mg by mouth daily  2/16/2022 at Unknown time Yes Unknown, Entered By History       The information provided in this note is only as accurate as the sources available at the time of update(s)

## 2022-02-17 NOTE — CONSULTS
"    [facility]    Stroke Consult Note    Reason for Consult: \"stroke\"    Chief Complaint: No chief complaint on file.    HPI  Leydi Atkinson is a 66 year old female with history of breast cancer s/p partial mastectomy [in remission] currently participating in a clinical trial андрей MALDONADO of segmental LL lobe PE and SVC thrombus [4/2021] on Xarelto 20 mg daily, HLD, who presented to [facility] emergency department on 2/16/2021 for evalution of new dysarthria that was initially noted by the patients family the night prior [2/15]. Upon arrival to ED initial documented BP was 155/104, NIH was 3, MR imaging was obtained which revealed a R MCA stroke. Patient was transferred to [facility] for further medical management.     This morning patient underwent repeat vessel imaging for further evaluation of the R ICA abnormality noted on MRA neck. On exam patient demonstrated continued symptoms with a L facial droop, mild dysarthria, and L sided neglect. Patient reports she has not missed any recent doses of her Xarelto. She does not endorse recent abrupt recent weight loss, change in appetite, night sweats, fevers, chills.     Stroke Evaluation Summarized  MRI/Head CT MRI brain 2/16/22: Right MCA territory acute/subacute infarction without hemorrhagic conversion. Mild associated mass effect is present, however there is no midline shift   Intracranial Vasculature MRA head 2/16/22:  distal right M1 middle cerebral artery occlusion    CTA head 2/17/22: distal right M1 segment involving the  proximal M2 branches   Cervical Vasculature MRA neck 2/16/22: Mild diffuse narrowing of the right common carotid artery and right cervical ICA    CTA neck 2/17/22: unrevealing      Echocardiogram Pending    EKG/Telemetry SR    Other Testing CT c/a/p: ordered      LDL  2/17/2022: 113 mg/dL   A1C  2/17/2022: 5.0 %   Troponin 2/17/2022: 8 ng/L     Impression  66 year old female with history of breast cancer [in " "remission] on chronic AC PTA due to recent history of a segmental LL lobe PE and SVC thrombus [4/2021] who presented to [facility] ED for evaluation of new L facial droop and dysarthria. MRI brain revealed R MCA infarct, vessel imaging revealed a R distal M1 occlusion. Etiology being worked up as ESUS, given history of cancer and recent  SVC thrombus + PE, will obtain Echo study with bubble and CT chest/abdom/pelvis. Vascular medicine consult should be placed given complexity of patient and evidence of stroke while on AC therapy.     Recommendations  -Hold PTA Xarelto for now given size of infract.   --load with Plavix 300 mg today, then DAPT with ASA 81 mg + Plavix 75 mg during interim   --repeat head CT on post stroke day 4 [2/20/22], if stable then can discontinue DAPT therapy and restart PTA Xarelto if no changes made to PTA AC regimen  -Echocardiogram WITH bubble study pending   -CT chest/abdomen/pelvis ordered  -Consult Vascular Medicine  -, start Atorvastatin 80 mg daily, recheck Lipid panel in 3 months as outpatient and titrate medication as needed to reach target goal 40-70  -Continue inpatient telemetry, likely will discharge with 30 day cardiac event monitor to evaluate for atrial fibrillation  -BP goal: Normotensive while inpatient as tolerated -- with long term goal BP <130/80 with tighter control associated with decreased overall CV risk, if tolerated  -A1c within goal range < 7.0%  -Follow up reported lung nodule that was found on vessel imaging to consider CT chest in 4-6 weeks for further evalution of visualized nodule in right lung apex,     Patient Follow-up    - final recommendation pending work-up    Thank you for this consult. We will continue to follow.     Paula Lundberg PA-C   Neurology  To page me or covering stroke neurology team member, click here: AMCOM   Choose \"On Call\" tab at top, then search dropdown box for \"Neurology Adult\", select location, press Enter, then look for " stroke/neuro ICU/telestroke.  _____________________________________________________    Clinically Significant Risk Factors Present on Admission        # Hypokalemia: K = 2.9 mmol/L (Ref range: 3.4 - 5.3 mmol/L) on admission, will replace as needed      # Coagulation Defect: INR = 1.63 (Ref range: 0.85 - 1.15) and/or PTT = N/A on admission, will monitor for bleeding        Past Medical History   Past Medical History:   Diagnosis Date     Asthma      Depression      Past Surgical History   Past Surgical History:   Procedure Laterality Date     LAPAROSCOPIC REMOVAL VAGAL NERVE BLOCKER  9/16/2013    Procedure: LAPAROSCOPIC REMOVAL VAGAL NERVE BLOCKER;  Laparoscopic Removal Of Vagal Nerve Leads And Subcutaneous Neuromodulator ;  Surgeon: Sigifredo Bernabe MD;  Location: [facility] OR     TUBAL LIGATION       uterine fibroids[       VBLOC[       Medications   Home Meds  Prior to Admission medications    Medication Sig Start Date End Date Taking? Authorizing Provider   albuterol (2.5 MG/3ML) 0.083% nebulizer solution Take 1 ampule by nebulization every 6 hours as needed.    Reported, Patient   albuterol (PROAIR HFA) 108 (90 BASE) MCG/ACT inhaler Inhale 2 puffs into the lungs every 6 hours as needed.    Reported, Patient   citalopram (CELEXA) 40 MG tablet Take 40 mg by mouth daily    Reported, Patient   Phentermine-Topiramate 11.25-69 MG CP24 Take 1 capsule by mouth daily NEEDS APPOINTMENT 12/19/16   Tde Akbar MD   SIMVASTATIN PO Take 40 mg by mouth daily    Reported, Patient   BuPROPion HCl ER, XL, 450 MG TB24 Take 300 mg by mouth daily  1/3/14  Reported, Patient   TRAZODONE HCL PO Take 100 mg by mouth daily  1/3/14  Reported, Patient       Scheduled Meds    atorvastatin  80 mg Oral QPM     iopamidol (ISOVUE-370)  70 mL Intravenous Once     potassium chloride  10 mEq Intravenous Q1H     sodium chloride 0.9 %  90 mL Intravenous Once     sodium chloride (PF)  3 mL Intracatheter Q8H       Infusion Meds    -  MEDICATION INSTRUCTIONS -       - MEDICATION INSTRUCTIONS -       - MEDICATION INSTRUCTIONS -         PRN Meds  hydrOXYzine, lidocaine 4%, lidocaine (buffered or not buffered), - MEDICATION INSTRUCTIONS -, - MEDICATION INSTRUCTIONS -, melatonin, ondansetron **OR** ondansetron, - MEDICATION INSTRUCTIONS -, sodium chloride (PF)    Allergies   Allergies   Allergen Reactions     Sulfa Drugs      Family History   No family history on file.  Social History   Social History     Tobacco Use     Smoking status: Never Smoker     Smokeless tobacco: Not on file   Substance Use Topics     Alcohol use: Yes     Comment: Mild     Drug use: Not on file     Review of Systems   The 10 point Review of Systems is negative other than noted in the HPI or here.      PHYSICAL EXAMINATION   Temp:  [97.9  F (36.6  C)-98.8  F (37.1  C)] 98.8  F (37.1  C)  Pulse:  [] 95  Resp:  [16-18] 16  BP: ()/() 97/61  SpO2:  [95 %-99 %] 95 %    Neurologic  Mental Status:  alert, oriented x 3, follows commands, mild dysarthria   Cranial Nerves:  EOMI with normal smooth pursuit, indeterminate visual field testing so there is question if there is a small visual field cut vs L sided visual neglect, L facial droop, facial sensation intact and symmetric, hearing not formally tested but intact to conversation, shoulder shrug strong bilaterally, tongue protrusion midline  Motor:  normal muscle tone and bulk, no abnormal movements, able to move all limbs spontaneously, LLE antigravity for the entire 5 seconds but subtle weakness against resistance   Reflexes:  deferred  Sensory:  light touch sensation intact and symmetric throughout upper and lower extremities, no extinction on double simultaneous stimulation   Coordination:  normal finger-to-nose and heel-to-shin bilaterally without dysmetria  Station/Gait:  deferred    Dysphagia Screen  Per Nursing    Stroke Scales    NIHSS  Interval transfer (02/17/22 7342)   Interval Comments     1a. Level of  Consciousness 0-->Alert, keenly responsive   1b. LOC Questions 0-->Answers both questions correctly   1c. LOC Commands 0-->Performs both tasks correctly   2.   Best Gaze 0-->Normal   3.   Visual 1-->Partial hemianopia (unclear if ther is a parial L visual field cut)   4.   Facial Palsy 1-->Minor paralysis (flattened nasolabial fold, asymmetry on smiling)   5a. Motor Arm, Left 0-->No drift, limb holds 90 (or 45) degrees for full 10 secs   5b. Motor Arm, Right 0-->No drift, limb holds 90 (or 45) degrees for full 10 secs   6a. Motor Leg, Left 0-->No drift, leg holds 30 degree position for full 5 secs   6b. Motor Leg, right 0-->No drift, leg holds 30 degree position for full 5 secs   7.   Limb Ataxia 0-->Absent   8.   Sensory 0-->Normal, no sensory loss   9.   Best Language 0-->No aphasia, normal   10. Dysarthria 1-->Mild-to-moderate dysarthria, patient slurs at least some words and, at worst, can be understood with some difficulty   11. Extinction and Inattention  1-->Visual, tactile, auditory, spatial, or personal inattention or extinction to bilateral simultaneous stimulation in one of the sensory modalities (L side visual neglect)   Total 4 (02/17/22 1030)     Imaging  I personally reviewed all imaging; relevant findings per HPI.    Labs Data   CBC  Recent Labs   Lab 02/16/22 2204   WBC 3.9*   RBC 3.59*   HGB 13.2   HCT 37.1        Basic Metabolic Panel   Recent Labs   Lab 02/17/22  0634 02/17/22  0435 02/16/22 2204   NA  --   --  139   POTASSIUM  --  2.9* 3.1*   CHLORIDE  --   --  103   CO2  --   --  25   BUN  --   --  5*   CR  --   --  0.84   GLC 91  --  105   NATALIE  --   --  8.7     Liver Panel  Recent Labs   Lab 02/16/22 2204   PROTTOTAL 6.9   ALBUMIN 3.5   BILITOTAL 0.5   ALKPHOS 73   AST 20   ALT 22     INR    Recent Labs   Lab Test 02/16/22 2204   INR 1.63*      Lipid Profile    Recent Labs   Lab Test 02/17/22  0435   CHOL 178   HDL 31*   *   TRIG 168*     A1C    Recent Labs   Lab Test  02/17/22  0435   A1C 5.0     Troponin I  No results for input(s): TROPONIN, GHTROP in the last 168 hours.       Stroke Consult Data Data   This was a non-emergent, non-telestroke consult.

## 2022-02-17 NOTE — H&P
[facility]    History and Physical - Hospitalist Service       Date of Admission:  2/17/2022    Assessment & Plan      Leydi Atkinson is a 66 year old female with past medical history significant for breast cancer and prior pulmonary embolism. admitted on 2/17/2022 with acute stroke.     Acute ischemic stroke of the right MCA territory  Suspected right ICA dissection  Pt presented to the ED with >24 hours of slurred speech, balance issues and left sided facial droop. MRI obtained and showed R superior MCA acute infarction. MRA of the neck showed long segment stenosis of the R ICA from its origin to the base of the skill and MRA of the brain showed R distal MCA occlusion.   - Neurochecks and Vital Signs every 4 hours   - Stroke neuro consulted   - Permissive HTN; goal SBP < 220 mmHg  - Need to clarify home meds. If patient is on full dose rivaroxaban, she may continue that.   - Patient was already given aspirin 325 mg in the ED. Hold off on further antiplatelet therapy until after the CTA and after the home medications are clarified   - Statin: atorvastatin 20 mg/d for now   - CTA head and neck for better characterization of the R ICA disease  - Telemetry, EKG, TTE  - Bedside Glucose Monitoring  - A1c, Lipid Panel, Troponin x 3  - PT/OT/SLP  - Stroke Education  - Euthermia, Euglycemia    Hypokalemia  Hypomagnesemia   Potassium 3.1, magnesium 1.6 on admission.   - Replace per protocol    Elevated INR  INR = 1.63 (Ref range: 0.85 - 1.15) Possibly nutritional.   - Monitor     Hx of breast cancer   T1c, N3, M0 infiltrating ductal carcinoma of the right breast, ER/NM positive, her-2 nicolas negative. SP mastectomy, chemotherapy and radiation treatments. She is currently enrolled in the JOEL trial.  - She remains on Letrozole 2.5mg daily and study drug (ribociclib) 400mg BID - hold while admitted to the hospital.     Hx of pulmonary Embolism (4/2021)   - Continue Rivaroxaban 20mg daily    Hx of  "Monoclonal B cell lymphocytosis, lambda restricted.   Extensive lab testing at [facility] detected 3% monoclonal B cell lymphocyte  - Not currently on any treatment for this.     Obesity: Estimated body mass index is 36.56 kg/m  as calculated from the following:    Height as of 2/16/22: 1.626 m (5' 4\").    Weight as of 2/16/22: 96.6 kg (213 lb)        Diet: NPO until evaluated by SLP   DVT Prophylaxis: DOAC  Vera Catheter: Not present  Central Lines: None  Cardiac Monitoring: None  Code Status: Full Code       Disposition Plan   Expected Discharge: TBD  Anticipated discharge location:  Awaiting care coordination huddle      The patient's care was discussed with the Patient.    Nellie Simmons  [facility]  [facility]  Securely message with the Vocera Web Console (learn more here)  Text page via [employer] Paging/Directory         ______________________________________________________________________    Chief Complaint   Slurred speech, balance issues     History is obtained from the patient    History of Present Illness   Leydi Atkinson is a 66 year old female who presented to an outside emergency department with possible stroke symptoms.  She reports that she initially started noticing some symptoms on Tuesday evening (2/15).  At that time she endorsed some faculties with speech and troubles with balance.  When her family went in to visit her today (2/16) they noted that her speech was significantly abnormal.  She also had a quite obvious left-sided facial droop.    In the emergency department she got an MRI of her brain which showed right hemispheric injury and probable distal right M1 occlusion.  Neurology was consulted, given the duration of her symptoms she was not given thrombolytics.  She was initiated on full dose aspirin.    She is doing ok currently. She does note some on-going slurred speech. She denies any other symptoms. She denies preceding symptoms such as chest " pain or palpitations. No shortness of breath. No headache or neck pain. She denies personal or family hx of stroke. She denies tobacco use.     Review of Systems    The 10 point Review of Systems is negative other than noted in the HPI or here.     Past Medical History    I have reviewed this patient's medical history and updated it with pertinent information if needed.   Past Medical History:   Diagnosis Date     Asthma      Depression        Past Surgical History   I have reviewed this patient's surgical history and updated it with pertinent information if needed.  Past Surgical History:   Procedure Laterality Date     LAPAROSCOPIC REMOVAL VAGAL NERVE BLOCKER  9/16/2013    Procedure: LAPAROSCOPIC REMOVAL VAGAL NERVE BLOCKER;  Laparoscopic Removal Of Vagal Nerve Leads And Subcutaneous Neuromodulator ;  Surgeon: Sigifreod Bernabe MD;  Location: [facility] OR     TUBAL LIGATION       uterine fibroids[       VBLOC[         Social History   I have reviewed this patient's social history and updated it with pertinent information if needed.  Social History     Tobacco Use     Smoking status: Never Smoker     Smokeless tobacco: Not on file   Substance Use Topics     Alcohol use: Yes     Comment: Mild     Drug use: Not on file       Family History   No family hx of strokes    Prior to Admission Medications   Prior to Admission Medications   Prescriptions Last Dose Informant Patient Reported? Taking?   Phentermine-Topiramate 11.25-69 MG CP24   No No   Sig: Take 1 capsule by mouth daily NEEDS APPOINTMENT   SIMVASTATIN PO   Yes No   Sig: Take 40 mg by mouth daily   albuterol (2.5 MG/3ML) 0.083% nebulizer solution   Yes No   Sig: Take 1 ampule by nebulization every 6 hours as needed.   albuterol (PROAIR HFA) 108 (90 BASE) MCG/ACT inhaler   Yes No   Sig: Inhale 2 puffs into the lungs every 6 hours as needed.   citalopram (CELEXA) 40 MG tablet   Yes No   Sig: Take 40 mg by mouth daily      Facility-Administered Medications: None      Allergies   Allergies   Allergen Reactions     Sulfa Drugs        Physical Exam   Vital Signs: Temp: 98  F (36.7  C) Temp src: Oral BP: 128/74 Pulse: 103   Resp: 16 SpO2: 96 % O2 Device: None (Room air)    Weight: 0 lbs 0 oz    Constitutional: Awake, alert, cooperative, no apparent distress.  Eyes: Conjunctiva and pupils examined and normal.  HEENT: Moist mucous membranes, normal dentition.  Respiratory: Clear to auscultation bilaterally, no crackles or wheezing.  Cardiovascular: Regular rate and rhythm, normal S1 and S2, and no murmur noted.  GI: Soft, non-distended, non-tender, normal bowel sounds.  Skin: No rashes, no cyanosis, no edema.  Musculoskeletal: No joint swelling, erythema or tenderness.  Neurologic: mild left sided lip droop, mild dysarthria, mild uncoordination of L hand, intact strength  Psychiatric: Alert, oriented to person, place and time, no obvious anxiety or depression.      Data   Data reviewed today: I reviewed all medications, new labs and imaging results over the last 24 hours.  Recent Labs   Lab 02/16/22  2204   WBC 3.9*   HGB 13.2   *      INR 1.63*      POTASSIUM 3.1*   CHLORIDE 103   CO2 25   BUN 5*   CR 0.84   ANIONGAP 11   NATALIE 8.7      ALBUMIN 3.5   PROTTOTAL 6.9   BILITOTAL 0.5   ALKPHOS 73   ALT 22   AST 20     Recent Results (from the past 24 hour(s))   MR Brain w/o & w Contrast    Narrative    EXAM: MR BRAIN W/O AND W CONTRAST  LOCATION: [facility]  DATE/TIME: 2/16/2022 11:31 PM    INDICATION: Stroke, follow up.  COMPARISON: None.  CONTRAST: 9.5 mL Gadavist.  TECHNIQUE: Routine multiplanar multisequence head MRI without and with intravenous contrast.    FINDINGS: Multiple sequences are degraded by patient motion.  INTRACRANIAL CONTENTS: There is patchy abnormal diffusion restriction centered within the right MCA territory involving the right temporal lobe, right insula, right parietal lobe, right posterior frontal lobe, and  right corona radiata. There is   associated T2/FLAIR signal hyperintensity and gyral edema in these regions. There is mild associated effacement of the posterior body of the right lateral ventricle due to mass effect from the region of ischemic injury involving the right parietal lobe.   No significant midline shift is present. No associated hemorrhage is identified at this time. No mass, acute hemorrhage, or extra-axial fluid collections. Mild generalized cerebral atrophy. No hydrocephalus. Normal position of the cerebellar tonsils. No   pathologic contrast enhancement. Prominent collateral vessels are noted involving the right cerebral hemisphere.    SELLA: No abnormality accounting for technique.    OSSEOUS STRUCTURES/SOFT TISSUES: Normal marrow signal. The distal right M1 middle cerebral artery loses its normal flow-related signal, likely due to occlusion.     ORBITS: No abnormality accounting for technique.     SINUSES/MASTOIDS: No paranasal sinus mucosal disease. No middle ear or mastoid effusion.       Impression    IMPRESSION:  1.  Right MCA territory acute/subacute infarction without hemorrhagic conversion. Mild associated mass effect is present, however there is no midline shift. No associated hemorrhage.  2.  Additional findings above.            Dr. Dyllan Hadley discussed results with Dr. ROSEANNA Collins on 2/17/2022 12:55 AM.   MRA Brain ([place]) wo Contrast    Narrative    EXAM: MRA BRAIN ([place]) WO CONTRAST  LOCATION: [facility]  DATE/TIME: 2/16/2022 11:32 PM    INDICATION: Neuro deficit, acute, stroke suspected.  COMPARISON: None.  TECHNIQUE: 3D time-of-flight head MRA without intravenous contrast.    FINDINGS:  ANTERIOR CIRCULATION: There is a distal right M1 middle cerebral artery occlusion. No significant distal reconstitution is noted involving the inferior and posterior division right middle cerebral arteries. There is a single right anterior ascending    division MCA which demonstrates persistent flow-related signal. The right A1 anterior cerebral artery is aplastic. No TELMA or left MCA occlusion is identified. No aneurysm. No high flow vascular malformation. Standard [place] anatomy.    POSTERIOR CIRCULATION: No stenosis/occlusion, aneurysm, or high flow vascular malformation. Balanced vertebral arteries supply a normal basilar artery.       Impression    IMPRESSION:  1.  Distal right M1 middle cerebral artery occlusion, as above.        Dr. Dyllan Hadley discussed results with Dr. ROSEANNA Collins on 2/17/2022 12:55 AM.   MRA Neck (Carotids) wo & w Contrast    Narrative    EXAM: MRA NECK (CAROTIDS) WO AND W CONTRAST  LOCATION: [facility]  DATE/TIME: 2/16/2022 11:33 PM    INDICATION: Neuro deficit, acute, stroke suspected.  COMPARISON: None.  CONTRAST: 9.5 mL Gadavist.  TECHNIQUE: Neck MRA without and with IV contrast. Stenosis measurements made according to NASCET criteria unless otherwise specified.    FINDINGS:  RIGHT CAROTID: No high-grade stenosis or dissection. Mild diffuse luminal narrowing of the right common carotid artery and right cervical ICA. Mild tortuosity of the right ICA.    LEFT CAROTID: No measurable stenosis or dissection. Tortuosity of the left ICA is noted.    VERTEBRAL ARTERIES: No focal stenosis or dissection. Tortuosity of the bilateral vertebral arteries is noted and likely related to chronic hypertension. Fibromuscular dysplasia favored less likely. Balanced vertebral arteries.    AORTIC ARCH: Classic aortic arch anatomy with no significant stenosis at the origin of the great vessels.      Impression    IMPRESSION:  1.  No high-grade stenosis or dissection.  2.  Mild diffuse narrowing of the right common carotid artery and right cervical ICA.  3.  Additional findings above.

## 2022-02-17 NOTE — PROGRESS NOTES
Pt transferred from [facility] due to stroke workup.  MRI showing possible R MCA stroke and possible R ICA dissection.  Pt alert and oriented x4.  Neuro deficits are L facial droop.  Pt has R upper extremity restriction due to mastectomy from breast cancer. NPO til speech evaluation.  Assist of 1 with gait belt walker.

## 2022-02-17 NOTE — CONSULTS
"    [facility]    Stroke Telephone Note    I was called by Neymar Collins on 02/17/22 regarding patient Leydi Atkinson. The patient is a 66 year old female with history of breast cancer and PE (possibly on xarelto, and dose is unclear), who on Tuesday 2/15/22 felt out of balance and her family noticed that she was slurred on the phone. On Wednesday, family went to see her and found that she was still slurred so they brought her to the ED.       Imaging Findings   MRI brain: R superior MCA acute infarction   MRA neck: log segment stenosis of R ICA from its origin to the base of the skull   MRA neck: R distal MCA occlusion       Impression  Acute ischemic stroke of the R MCA territory due to undetermined etiology - suspect R ICA dissection.   According to the ED MD, the patient is on rivaroxaban after PE, which would explain the elevated IRN but that has not been confirmed yet. The dose is also unknown.     Recommendations   Patient will be transferred to [facility] for an admission.     - Use orderset: \"Ischemic Stroke Routine Admission\" or \"Ischemic Stroke No Thrombolytics/No Thrombectomy ICU Admission\"  - Neurochecks and Vital Signs every 4 hours   - Permissive HTN; goal SBP < 220 mmHg  - Need to clarify home meds. If patient is on full dose rivaroxaban, she may continue that.   - Patient was already given aspirin 325 mg in the ED. Hold off on further antiplatelet therapy until after the CTA and after the home medications are clarified   - Statin: atorvastatin 20 mg/d for now   - CTA head and neck for better characterization of the R ICA disease  - Telemetry, EKG  - Bedside Glucose Monitoring  - A1c, Lipid Panel, Troponin x 3  - PT/OT/SLP  - Stroke Education  - Euthermia, Euglycemia    My recommendations are based on the information provided over the phone by Leydi SRINATH [place]'s in-person providers. They are not intended to replace the clinical judgment of her in-person providers. I was not " "requested to personally see or examine the patient at this time.        Nima Cifuentes MD, Msc, CECE, KEMN   of Neurology  [facility]     02/17/2022 2:21 AM  To page me or covering stroke neurology team member, click here: AMCOM  Choose \"On Call\" tab at top, then search dropdown box for \"Neurology Adult\" & press Enter, look for Neuro ICU/Stroke      "

## 2022-02-17 NOTE — PROGRESS NOTES
02/17/22 1500   Quick Adds   Type of Visit Initial PT Evaluation   Living Environment   People in Home alone   Current Living Arrangements house   Home Accessibility stairs to enter home   Number of Stairs, Main Entrance 1   Stair Railings, Main Entrance railings safe and in good condition;railing on right side (ascending)   Transportation Anticipated family or friend will provide  (Patient does typically drive. )   Living Environment Comments All needs met on one level. Neighbor does yardwork and shoveling. Patient does own shopping, cooking, cleaning.    Self-Care   Usual Activity Tolerance good   Regular Exercise Yes   Activity/Exercise Type walking  (Walks dog. )   Exercise Amount/Frequency daily   Equipment Currently Used at Home shower chair  (Has walker and cane but not needing to use. )   Fall history within last six months no   Activity/Exercise/Self-Care Comment I at baseline.    General Information   Onset of Illness/Injury or Date of Surgery 02/17/22   Referring Physician Gail Mcgill MD   Patient/Family Therapy Goals Statement (PT) To go home. Reports her daughters and/or her mom can stay with her.    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 MRI with moderate size R hemispheric (temporal, insula, parietal, posterior frontal, corona radiata) ischemic stroke.   Existing Precautions/Restrictions fall   Cognition   Affect/Mental Status (Cognition) WNL   Orientation Status (Cognition) oriented x 4   Cognitive Status Comments Able to provided PLOF without difficulty.    Pain Assessment   Patient Currently in Pain No   Integumentary/Edema   Integumentary/Edema no deficits were identifed   Posture    Posture Not impaired    Range of Motion (ROM)   Range of Motion ROM is WNL   Strength (Manual Muscle Testing)   Strength Comments Mildy decreased strength Left LE only noted during testing in standing. Difficulty clearing left heel with heel raises and with bilateral DF simultaneously left LE slower and decreased range. Slightly less steady with weight bearing on left.    Bed Mobility   Comment, (Bed Mobility) Independent   Transfers   Comment, (Transfers) Independent sit to stand.    Gait/Stairs (Locomotion)   Comment, (Gait/Stairs) Gait without AD with close CGA. Slow with decreased step length and guarded.    Balance   Balance Comments Static standing with feet shd width apart-good balnace. Rhomberg + with falling left and backwards. Decreased balance with dynamic challenges during amb.    Sensory Examination   Sensory Perception Comments Reports decreased sensation bilateral hands and feet (baseline).    Coordination   Coordination no deficits were identified   Clinical Impression   Criteria for Skilled Therapeutic Intervention Yes, treatment indicated   PT Diagnosis (PT) Impaired functional independence   Influenced by the following impairments Decreased balance, decreased functional strength on left.    Functional limitations due to impairments Needs assist for transfer and amb    Clinical Presentation (PT Evaluation Complexity) Stable/Uncomplicated   Clinical Presentation Rationale <3 factors affecting funtional mobility.    Clinical Decision Making (Complexity) low complexity   Planned Therapy Interventions (PT) balance training;gait training;stair training;transfer training   Anticipated Equipment Needs at Discharge (PT)   (Has a walker already. )   Risk & Benefits of therapy have been explained evaluation/treatment results reviewed;care plan/treatment goals reviewed;current/potential barriers reviewed;participants voiced agreement with care plan;participants included;patient   PT Discharge Planning   PT Discharge Recommendation  (DC Rec) home with assist;home with outpatient physical therapy   PT Rationale for DC Rec Patient is independent at baseline and she lives alone (with her dog). Currently, patient with mildly decreased left LE functional strength, decreased balance and decreased independence with amb. Recommend home with assist and pateint reports her daughter or mom (80+yo in good shapr) can stay with her and assist with IADL's and transportation. Recommend OPPT to progress back to baseline independence.    PT Brief overview of current status Up with 1, GB and walker.    Total Evaluation Time   Total Evaluation Time (Minutes) 17   Physical Therapy Goals   PT Frequency 2x/day   PT Predicated Duration/Target Date for Goal Attainment 02/19/22   PT Goals Transfers;Gait;Stairs;PT Goal 1   PT: Transfers Independent;Bed to/from chair   PT: Gait Modified independent;Rolling walker;Greater than 200 feet   PT: Stairs Supervision/stand-by assist;1 stair;Rail on both sides

## 2022-02-17 NOTE — PROGRESS NOTES
02/17/22 1112   Quick Adds   Type of Visit Initial Occupational Therapy Evaluation   Living Environment   People in Home alone  (dog)   Current Living Arrangements house   Home Accessibility stairs to enter home   Number of Stairs, Main Entrance 1   Stair Railings, Main Entrance railings safe and in good condition;railing on right side (ascending)   Transportation Anticipated family or friend will provide   Living Environment Comments All needs met on main floor. Tub shower with grab bars.   Self-Care   Usual Activity Tolerance good   Regular Exercise Yes   Activity/Exercise Type walking  (walks her dog)   Exercise Amount/Frequency daily   Equipment Currently Used at Home shower chair  (has FWW and cane but does not use)   Fall history within last six months no   Activity/Exercise/Self-Care Comment Pt reports IND at baseline with functional mobility and I/ADLs   Instrumental Activities of Daily Living (IADL)   Previous Responsibilities driving;meal prep;housekeeping;laundry;shopping;medication management;finances   General Information   Onset of Illness/Injury or Date of Surgery 02/17/22   Referring Physician Nellie Simmons   Additional Occupational Profile Info/Pertinent History of Current Problem Pt is 67 yo female admitted with slurred speech and impaired balance, MRI finding R superior MCA acute infarct. PMH significant for breast cancer with mastectomy and chemotherapy (pt reporting 2 yrs prior) and PE in 4/2021   Existing Precautions/Restrictions fall   Right Upper Extremity (Weight-bearing Status)   (hx of masctectomy 2 yrs prior- pt reporting no restrictions)   Cognitive Status Examination   Orientation Status orientation to person, place and time   Affect/Mental Status (Cognitive) WFL   Follows Commands follows one-step commands;75-90% accuracy   Safety Deficit safety precautions follow-through/compliance;minimal deficit;safety precautions awareness   Memory Deficit minimal deficit;short-term memory    Cognitive Status Comments Pt pleasant and cooperative, slightly impaired follow-through with safety (ex. waiting for therapist to return to bathroom prior to standing after being asked to wait). Will do formal cognition screen to rule out deficits   Visual Perception   Visual Impairment/Limitations corrective lenses full-time   Impact of Vision Impairment on Function (Vision) No impairment with visual scanning, saccades and peripheral vision. Pt occasionally bumping into items on L side while walking in [place], unclear if due to neglect or unfamiliarity with FWW management   Sensory   Sensory Comments Baseline neuropathy, numbness and tingling in hands and feet   Pain Assessment   Patient Currently in Pain No   Integumentary/Edema   Integumentary/Edema no deficits were identifed   Posture   Posture forward head position;protracted shoulders   Range of Motion Comprehensive   General Range of Motion bilateral upper extremity ROM WFL   Strength Comprehensive (MMT)   Comment, General Manual Muscle Testing (MMT) Assessment B UE and LE 4/5, very slightly weaker in L LE than R.    Muscle Tone Assessment   Muscle Tone Quick Adds No deficits were identified   Coordination   Upper Extremity Coordination No deficits were identified   Bed Mobility   Bed Mobility supine-sit   Supine-Sit [place] (Bed Mobility) supervision   Assistive Device (Bed Mobility) bed rails   Transfers   Transfers sit-stand transfer;toilet transfer   Sit-Stand Transfer   Sit-Stand [place] (Transfers) supervision;verbal cues   Assistive Device (Sit-Stand Transfers) walker, front-wheeled   Toilet Transfer   Type (Toilet Transfer) sit-stand;stand-sit   [place] Level (Toilet Transfer) supervision   Assistive Device (Toilet Transfer) grab bars/safety frame;walker, front-wheeled   Balance   Balance Comments Pt slightly unsteady with ambulation with FWW, requiring CGA-min A at times   Activities of Daily Living   BADL Assessment/Intervention lower  body dressing;toileting;upper body dressing   Upper Body Dressing Assessment/Training   [place] Level (Upper Body Dressing) modified independence   Lower Body Dressing Assessment/Training   [place] Level (Lower Body Dressing) supervision   Toileting   [place] Level (Toileting) supervision   Clinical Impression   Criteria for Skilled Therapeutic Interventions Met (OT) Yes, treatment indicated   OT Diagnosis Impaired independence with functional mobility and I/ADLs   OT Problem List-Impairments impacting ADL problems related to;activity tolerance impaired;balance;mobility;strength;coordination;cognition   Assessment of Occupational Performance 3-5 Performance Deficits   Identified Performance Deficits functional mobility, toileting, bathing, higher level I/ADLs   Planned Therapy Interventions (OT) ADL retraining;IADL retraining;cognition;transfer training   Clinical Decision Making Complexity (OT) moderate complexity   Anticipated Equipment Needs Upon Discharge (OT)   (TBD)   Risk & Benefits of therapy have been explained evaluation/treatment results reviewed;care plan/treatment goals reviewed;risks/benefits reviewed;participants included;patient   OT Discharge Planning   OT Discharge Recommendation (DC Rec) home with assist;home with outpatient occupational therapy   OT Rationale for DC Rec Pt below baseline for functional mobility and I/ADL performance. Pt lives alone and is a falls risk at this time but reports she can stay with one of her daughters following discharge. Anticipate pt safe to discharge home with 24 hr supervision and assist as needed for I/ADLs. Pending results of cognition screening, pt may benefit from OP OT to address deficits. Will continue to update recommendations   OT Brief overview of current status SBA bed mobility and transfers, CGA ambulation   Total Evaluation Time (Minutes)   Total Evaluation Time (Minutes) 10   OT Goals   Therapy Frequency (OT) Daily   OT Predicated  Duration/Target Date for Goal Attainment 02/26/22   OT Goals Transfers;Toilet Transfer/Toileting;Cognition;Lower Body Dressing   OT: Lower Body Dressing Modified independent   OT: Transfer Modified independent;with assistive device   OT: Toilet Transfer/Toileting Modified independent;toilet transfer;cleaning and garment management;using adaptive equipment   OT: Cognitive Patient/caregiver will verbalize understanding of cognitive assessment results/recommendations as needed for safe discharge planning

## 2022-02-18 ENCOUNTER — APPOINTMENT (OUTPATIENT)
Dept: ULTRASOUND IMAGING | Facility: CLINIC | Age: 67
DRG: 064 | End: 2022-02-18
Attending: PHYSICIAN ASSISTANT
Payer: COMMERCIAL

## 2022-02-18 ENCOUNTER — APPOINTMENT (OUTPATIENT)
Dept: CT IMAGING | Facility: CLINIC | Age: 67
DRG: 064 | End: 2022-02-18
Attending: PHYSICIAN ASSISTANT
Payer: COMMERCIAL

## 2022-02-18 LAB
ANION GAP SERPL CALCULATED.3IONS-SCNC: 6 MMOL/L (ref 3–14)
BUN SERPL-MCNC: 7 MG/DL (ref 7–30)
CALCIUM SERPL-MCNC: 8.6 MG/DL (ref 8.5–10.1)
CHLORIDE BLD-SCNC: 106 MMOL/L (ref 94–109)
CO2 SERPL-SCNC: 25 MMOL/L (ref 20–32)
CREAT SERPL-MCNC: 0.76 MG/DL (ref 0.52–1.04)
ERYTHROCYTE [DISTWIDTH] IN BLOOD BY AUTOMATED COUNT: 14.4 % (ref 10–15)
FACTOR 2 INTERPRETATION: NORMAL
FACTOR V INTERPRETATION: NORMAL
GFR SERPL CREATININE-BSD FRML MDRD: 87 ML/MIN/1.73M2
GLUCOSE BLD-MCNC: 98 MG/DL (ref 70–99)
HCT VFR BLD AUTO: 34.1 % (ref 35–47)
HGB BLD-MCNC: 11.5 G/DL (ref 11.7–15.7)
LAB DIRECTOR COMMENTS: NORMAL
LAB DIRECTOR DISCLAIMER: NORMAL
LAB DIRECTOR INTERPRETATION: NORMAL
LAB DIRECTOR METHODOLOGY: NORMAL
LAB DIRECTOR RESULTS: NORMAL
MAGNESIUM SERPL-MCNC: 2.1 MG/DL (ref 1.6–2.3)
MCH RBC QN AUTO: 35.7 PG (ref 26.5–33)
MCHC RBC AUTO-ENTMCNC: 33.7 G/DL (ref 31.5–36.5)
MCV RBC AUTO: 106 FL (ref 78–100)
PLATELET # BLD AUTO: 322 10E3/UL (ref 150–450)
POTASSIUM BLD-SCNC: 3.4 MMOL/L (ref 3.4–5.3)
RBC # BLD AUTO: 3.22 10E6/UL (ref 3.8–5.2)
SODIUM SERPL-SCNC: 137 MMOL/L (ref 133–144)
SPECIMEN DESCRIPTION: NORMAL
WBC # BLD AUTO: 3.1 10E3/UL (ref 4–11)

## 2022-02-18 PROCEDURE — 85730 THROMBOPLASTIN TIME PARTIAL: CPT | Performed by: INTERNAL MEDICINE

## 2022-02-18 PROCEDURE — 81240 F2 GENE: CPT | Performed by: INTERNAL MEDICINE

## 2022-02-18 PROCEDURE — 74177 CT ABD & PELVIS W/CONTRAST: CPT

## 2022-02-18 PROCEDURE — 85027 COMPLETE CBC AUTOMATED: CPT | Performed by: STUDENT IN AN ORGANIZED HEALTH CARE EDUCATION/TRAINING PROGRAM

## 2022-02-18 PROCEDURE — 36415 COLL VENOUS BLD VENIPUNCTURE: CPT | Performed by: INTERNAL MEDICINE

## 2022-02-18 PROCEDURE — 99223 1ST HOSP IP/OBS HIGH 75: CPT | Performed by: INTERNAL MEDICINE

## 2022-02-18 PROCEDURE — 250N000009 HC RX 250: Performed by: HOSPITALIST

## 2022-02-18 PROCEDURE — 86147 CARDIOLIPIN ANTIBODY EA IG: CPT | Performed by: INTERNAL MEDICINE

## 2022-02-18 PROCEDURE — 80048 BASIC METABOLIC PNL TOTAL CA: CPT | Performed by: STUDENT IN AN ORGANIZED HEALTH CARE EDUCATION/TRAINING PROGRAM

## 2022-02-18 PROCEDURE — 250N000011 HC RX IP 250 OP 636: Performed by: HOSPITALIST

## 2022-02-18 PROCEDURE — 81241 F5 GENE: CPT | Performed by: INTERNAL MEDICINE

## 2022-02-18 PROCEDURE — 99222 1ST HOSP IP/OBS MODERATE 55: CPT | Performed by: INTERNAL MEDICINE

## 2022-02-18 PROCEDURE — 93970 EXTREMITY STUDY: CPT

## 2022-02-18 PROCEDURE — 85300 ANTITHROMBIN III ACTIVITY: CPT | Performed by: INTERNAL MEDICINE

## 2022-02-18 PROCEDURE — 99232 SBSQ HOSP IP/OBS MODERATE 35: CPT | Performed by: HOSPITALIST

## 2022-02-18 PROCEDURE — 83735 ASSAY OF MAGNESIUM: CPT | Performed by: HOSPITALIST

## 2022-02-18 PROCEDURE — 250N000013 HC RX MED GY IP 250 OP 250 PS 637: Performed by: PHYSICIAN ASSISTANT

## 2022-02-18 PROCEDURE — 81240 F2 GENE: CPT | Performed by: FAMILY MEDICINE

## 2022-02-18 PROCEDURE — 86146 BETA-2 GLYCOPROTEIN ANTIBODY: CPT | Performed by: INTERNAL MEDICINE

## 2022-02-18 PROCEDURE — 120N000001 HC R&B MED SURG/OB

## 2022-02-18 PROCEDURE — 36415 COLL VENOUS BLD VENIPUNCTURE: CPT | Performed by: STUDENT IN AN ORGANIZED HEALTH CARE EDUCATION/TRAINING PROGRAM

## 2022-02-18 RX ORDER — IOPAMIDOL 755 MG/ML
107 INJECTION, SOLUTION INTRAVASCULAR ONCE
Status: COMPLETED | OUTPATIENT
Start: 2022-02-18 | End: 2022-02-18

## 2022-02-18 RX ADMIN — ATORVASTATIN CALCIUM 80 MG: 80 TABLET, FILM COATED ORAL at 20:24

## 2022-02-18 RX ADMIN — SODIUM CHLORIDE 73 ML: 9 INJECTION, SOLUTION INTRAVENOUS at 10:36

## 2022-02-18 RX ADMIN — ASPIRIN 81 MG: 81 TABLET, COATED ORAL at 08:24

## 2022-02-18 RX ADMIN — IOPAMIDOL 107 ML: 755 INJECTION, SOLUTION INTRAVENOUS at 10:36

## 2022-02-18 RX ADMIN — CLOPIDOGREL BISULFATE 75 MG: 75 TABLET ORAL at 08:24

## 2022-02-18 ASSESSMENT — ACTIVITIES OF DAILY LIVING (ADL)
TOILETING_ISSUES: NO
ADLS_ACUITY_SCORE: 4
ADLS_ACUITY_SCORE: 10
DOING_ERRANDS_INDEPENDENTLY_DIFFICULTY: NO
ADLS_ACUITY_SCORE: 4
DIFFICULTY_COMMUNICATING: NO
ADLS_ACUITY_SCORE: 12
ADLS_ACUITY_SCORE: 4
ADLS_ACUITY_SCORE: 4
ADLS_ACUITY_SCORE: 12
ADLS_ACUITY_SCORE: 4
ADLS_ACUITY_SCORE: 4
ADLS_ACUITY_SCORE: 12
ADLS_ACUITY_SCORE: 12
ADLS_ACUITY_SCORE: 4
EQUIPMENT_CURRENTLY_USED_AT_HOME: SHOWER CHAIR
ADLS_ACUITY_SCORE: 12
ADLS_ACUITY_SCORE: 4
VISION_MANAGEMENT: GLASSES
ADLS_ACUITY_SCORE: 12
ADLS_ACUITY_SCORE: 12
ADLS_ACUITY_SCORE: 4
DIFFICULTY_EATING/SWALLOWING: NO
WALKING_OR_CLIMBING_STAIRS_DIFFICULTY: NO
ADLS_ACUITY_SCORE: 12
FALL_HISTORY_WITHIN_LAST_SIX_MONTHS: NO
WEAR_GLASSES_OR_BLIND: YES
ADLS_ACUITY_SCORE: 4
DRESSING/BATHING_DIFFICULTY: NO
HEARING_DIFFICULTY_OR_DEAF: NO
ADLS_ACUITY_SCORE: 12
ADLS_ACUITY_SCORE: 4
CHANGE_IN_FUNCTIONAL_STATUS_SINCE_ONSET_OF_CURRENT_ILLNESS/INJURY: NO
CONCENTRATING,_REMEMBERING_OR_MAKING_DECISIONS_DIFFICULTY: NO

## 2022-02-18 NOTE — PLAN OF CARE
Goal Outcome Evaluation:     A and O x4. VSS. No pain. Slight slurring. Mild weakness LUE, L facial droop.Follows commands. No aphasia. No neglect or field cut noted although both were present during neurology team's assessment. Up with 1, belt, walker. Unsteady gait. Good appetite on regular diet. Scoring green on aggression screening tool. Plan is for CT of chest, abdomen, pelvis tomorrow.

## 2022-02-18 NOTE — PHARMACY
Pharmacy Consult to evaluate for medication related stroke core measures    Leydi Atkinson, 66 year old female admitted on 2/17/2022.Acute right MCA stroke    Thrombolytic was not given because of Time from onset contraindications    VTE Prophylaxis SCDs /PCDs placed on day2, as appropriate prior to end of hospital day 2.    Antithrombotic: aspirin started on day 2, as appropriate by end of hospital day 2. Continue antithrombotic therapy on discharge to meet quality measures, unless contraindicated.    Anticoagulation if history of A-fib/flutter: Patient does not have history of A-fib/flutter - anticoagulation not required for medication related stroke core measures.     LDL Cholesterol Calculated   Date Value Ref Range Status   02/17/2022 113 (H) <=100 mg/dL Final       Lipitor started.    Recommendations: None at this time    Thank you for the consult.    Jess Obando RPH 2/18/2022 10:25 AM

## 2022-02-18 NOTE — PROGRESS NOTES
[facility]    Hematology/Oncology Consult Note      Date of Admission:  2/17/2022  Date of Consult:  02/18/22  Reason for Consult: history of breast cancer, and DVT       ASSESSMENT/PLAN:  Leydi Atkinson is a 66 year old female with history of breast cancer in 2019 currently being followed by Dr. Chacon at [facility] and on adjuvant letrozole.  She presented with an acute stroke on Xarelto.  Bubble study positive    CT of the chest abdomen pelvis does not reveal any evidence of malignancy.    Neurology following the patient and she is currently on aspirin and Plavix.  Okay to start anticoagulation would recommend changing to Eliquis when neurology feels that it is appropriate to resume and the risk of hemorrhagic conversion is low.    Lower extremity Dopplers negative for DVT.  Unusual for patient to develop stroke on Xarelto therefore I agree that I would recommend checking for hypercoagulability will test for lupus anticoagulant testing in addition to Antithrombin III, factor V leiden and factor II mutation.     If she does have evidence of LAC, she may benefit from coumadin rather than DOACs long term.  Based on extensive review of her history it appears that the clot she has in 4/2021 may be unprovoked, however she did have a port in that site. I think long term anticoagulation decisions should be managed by her primary oncologist outpatient Dr Chacon. Rarely patients with letrozole (1%) can present with thrombotic stroke.  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 . Resume anticoagulation when neurology feels its safe to do so.      Dr Sanford will see the patient tomorrow.       HISTORY OF PRESENT ILLNESS: Leydi Atkinson is a 66 year old female Who presented with new onset of dysarthria, left facial  robyn.  MRI showed moderate right-sided hemispheric ischemic stroke.  Patient has a history of prior PE and SVC thrombus in April 2021 on Xarelto 20 mg every day.  Patient has been seen by neurology and the recommendations are to start aspirin with Plavix and holding off on Xarelto due to concern for conversion to hemorrhage in light of her recent ischemic stroke.  She was admitted on 2/16/2022.    Patient also has a history of breast cancer last seen by oncology on 2/7/2022.  She sees Dr. Chacon at [facility].  She had a T1 cN3 M0 infiltrating ductal carcinoma that was ER/MA positive HER-2/nicolas negative.  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  She underwent bilateral mastectomies with dose dense Adriamycin Cytoxan and weekly Taxol which she completed and September 2019 with radiation following the chemotherapy treatment.  She began letrozole and was on the JOEL study did not get randomized to ribocilcib arm.  She had a CT of chest abdomen pelvis completed yesterday which did not show any evidence of malignancy.    She had an echocardiogram/bubble study completed which showed bubble study being mildly positive for flow across the interatrial septum.  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       MEDICATIONS:  Current Facility-Administered Medications   Medication     aspirin EC tablet 81 mg     atorvastatin (LIPITOR) tablet 80 mg     clopidogrel (PLAVIX) tablet 75 mg     hydrOXYzine (ATARAX) tablet 25 mg     lidocaine (LMX4) cream     lidocaine 1 % 0.1-1 mL     medication instruction - No oral meds if patient didn't pass dysphagia screen     Medication Instructions - Avoid dextrose in IV solutions.     melatonin tablet 3 mg     ondansetron (ZOFRAN-ODT) ODT tab 4 mg    Or     ondansetron (ZOFRAN) injection 4  mg     Patient is already receiving anticoagulation with heparin, enoxaparin (LOVENOX), warfarin (COUMADIN)  or other anticoagulant medication     sodium chloride (PF) 0.9% PF flush 3 mL     sodium chloride (PF) 0.9% PF flush 3 mL     Facility-Administered Medications Ordered in Other Encounters   Medication     neostigmine (PROSTIGMINE) injection         ALLERGIES:  Allergies   Allergen Reactions     Sulfa Drugs          PAST MEDICAL HISTORY:  Past Medical History:   Diagnosis Date     Asthma      Depression          PAST SURGICAL HISTORY:  Past Surgical History:   Procedure Laterality Date     LAPAROSCOPIC REMOVAL VAGAL NERVE BLOCKER  9/16/2013    Procedure: LAPAROSCOPIC REMOVAL VAGAL NERVE BLOCKER;  Laparoscopic Removal Of Vagal Nerve Leads And Subcutaneous Neuromodulator ;  Surgeon: Sigifredo Bernabe MD;  Location: [facility] OR     TUBAL LIGATION       uterine fibroids[       VBLOC[           SOCIAL HISTORY:  Social History     Socioeconomic History     Marital status: Unknown     Spouse name: Not on file     Number of children: Not on file     Years of education: Not on file     Highest education level: Not on file   Occupational History     Not on file   Tobacco Use     Smoking status: Never Smoker     Smokeless tobacco: Not on file   Substance and Sexual Activity     Alcohol use: Yes     Comment: Mild     Drug use: Not on file     Sexual activity: Not on file   Other Topics Concern     Parent/sibling w/ CABG, MI or angioplasty before 65F 55M? Not Asked   Social History Narrative     Not on file     Social Determinants of Health     Financial Resource Strain: Not on file   Food Insecurity: Not on file   Transportation Needs: Not on file   Physical Activity: Not on file   Stress: Not on file   Social Connections: Not on file   Intimate Partner Violence: Not on file   Housing Stability: Not on file   lives alone daughters live nearby      FAMILY HISTORY:  FH of heart disease, no fh of strokes    PHYSICAL EXAM:  Vital  "signs:  Temp: 98.4  F (36.9  C) Temp src: Oral BP: 135/78 Pulse: 81   Resp: 16 SpO2: 97 % O2 Device: None (Room air)        Estimated body mass index is 36.56 kg/m  as calculated from the following:    Height as of 22: 1.626 m (5' 4\").    Weight as of 22: 96.6 kg (213 lb).    ECO  GENERAL/CONSTITUTIONAL: No acute distress.  EYES: Pupils are equal, round, and react to light and accommodation. Extraocular movements intact.  No scleral icterus.  ENT/MOUTH: Neck supple. Oropharynx clear, no mucositis.  LYMPH: No anterior cervical, posterior cervical, supraclavicular, axillary or inguinal adenopathy.   RESPIRATORY: Clear to auscultation bilaterally. No crackles or wheezing.   CARDIOVASCULAR: Regular rate and rhythm without murmurs, gallops, or rubs.  GASTROINTESTINAL: No hepatosplenomegaly, masses, or tenderness. The patient has normal bowel sounds. No guarding.  No distention.  MUSCULOSKELETAL: Warm and well-perfused, no cyanosis, clubbing, or edema.  NEUROLOGIC: Cranial nerves II-XII are intact. Alert, oriented, answers questions appropriately.  INTEGUMENTARY: No rashes or jaundice.  GAIT: Steady, does not use assistive device      LABS:  CBC RESULTS:   Recent Labs   Lab Test 22  0838   WBC 3.1*   RBC 3.22*   HGB 11.5*   HCT 34.1*   *   MCH 35.7*   MCHC 33.7   RDW 14.4          Recent Labs   Lab Test 22  0838 22  2101 22  1837 22  0435 22  2204     --   --   --  139   POTASSIUM 3.4  --  3.8   < > 3.1*   CHLORIDE 106  --   --   --  103   CO2 25  --   --   --  25   ANIONGAP 6  --   --   --  11   GLC 98 103*  --    < > 105   BUN 7  --   --   --  5*   CR 0.76  --   --   --  0.84   NATALIE 8.6  --   --   --  8.7    < > = values in this interval not displayed.         PATHOLOGY:  As per hpi    IMAGING:    Noted in epic, mri brain/ bubble study reviewed . Ct cap reviewed         Thank you for the opportunity to participate in this patient's care.  Please call " with any questions.    Conor Rosen MD  Hematology/Oncology  [facility]

## 2022-02-18 NOTE — PLAN OF CARE
Reason for Admission: R MCA CVA    Cognitive/Mentation: A/Ox 4  Neuros/CMS: Intact ex slightly slurred speech, L facial droop, slight LUE weakness, and baseline numbness/tingling in hands and feet.  VS: Stable. Tele: NSR.  GI: BS +, + flatus, last BM 2/16. Continent.  : Voids without issue. Continent.  Pulmonary: LS clear.  Pain: Denies.     Drains: None  Skin: None  Activity: Assist x 1 with GBW.  Diet: Regular with thin liquids. Takes pills whole.     Aggression Stoplight Score: Green  Therapies recs: Pending  Discharge: Pending    End of shift summary: Abdominal CT negative for lesions/mass. Lower extremity US negative for DVTs. No blood pressure on right arm due to history of mastectomy. Hem-onc and vascular medicine consults placed. Potassium and magnesium check in AM, within range and redraw scheduled for tomorrow.

## 2022-02-18 NOTE — PROGRESS NOTES
[facility]    Medicine Progress Note - Hospitalist Service    Date of Admission:  2/17/2022    Assessment & Plan     Assessment & Plan     Leydi Atkinson is a 66 year old female with past medical history significant for breast cancer and prior pulmonary embolism. admitted on 2/17/2022 with acute stroke.      Acute ischemic stroke of the right MCA territory  Suspected right ICA dissection  Pt presented to the ED with >24 hours of slurred speech, balance issues and left sided facial droop. MRI obtained and showed R superior MCA acute infarction. MRA of the neck showed long segment stenosis of the R ICA from its origin to the base of the skill and MRA of the brain showed R distal MCA occlusion.  -- Stroke consulted - appreciated  --Recommendation for aspirin Plavix for the next 2 to 4 days due to the size of the stroke.  --CT chest abdomen and pelvis to look for evidence of malignancy.  --Echo with bubble study  --Neurology recommendations to consult vascular medicine due to patient having a history of DVT PE on Xarelto, now presenting with stroke.  Asking for any further hypercoagulability work-up that needs to be done.  --Continue atorvastatin 80 mg.  -- Continue cardiac monitor for 30 days on discharge.   -- Neurochecks and Vital Signs every 4 hours   --Plan on PTA Xarelto for now per neuro recs.  -- vascular medicine consult to assist with hypercoagulability work up given patients hx of cancer, DVT/PE. - follow up recs.       Hypokalemia  Hypomagnesemia   Potassium 3.1, magnesium 1.6 on admission.   - Replace per protocol    Elevated INR  INR = 1.63 (Ref range: 0.85 - 1.15) Possibly nutritional.   -- will consider giving vitamin K if not improving.   - Monitor      Hx of breast cancer   T1c, N3, M0 infiltrating ductal carcinoma of the right breast, ER/NV positive, her-2 nicolas negative. SP mastectomy, chemotherapy and radiation treatments. She is currently enrolled in the JOEL trial.  -  "She remains on Letrozole 2.5mg daily and study drug (ribociclib) 400mg BID - hold while admitted to the hospital.      Hx of pulmonary Embolism (4/2021)   - Continue Rivaroxaban 20mg daily     Hx of Monoclonal B cell lymphocytosis, lambda restricted.   Extensive lab testing at [facility] detected 3% monoclonal B cell lymphocyte  - Not currently on any treatment for this.      Obesity: Estimated body mass index is 36.56 kg/m  as calculated from the following:    Height as of 2/16/22: 1.626 m (5' 4\").    Weight as of 2/16/22: 96.6 kg (213 lb)       Diet: Regular Diet Adult    DVT Prophylaxis: Pneumatic Compression Devices  Vera Catheter: Not present  Central Lines: None  Cardiac Monitoring: ACTIVE order. Indication: Stroke, acute (48 hours)  Code Status: Full Code      Disposition Plan   Expected Discharge: 02/19/2022     Anticipated discharge location:  Awaiting care coordination huddle  Delays:            The patient's care was discussed with the Patient.    Gail Livingston MD  Hospitalist Service  [facility]  Securely message with the Vocera Web Console (learn more here)  Text page via [employer] Paging/Directory         Clinically Significant Risk Factors Present on Admission              # Obesity: Estimated body mass index is 36.56 kg/m  as calculated from the following:    Height as of 2/16/22: 1.626 m (5' 4\").    Weight as of 2/16/22: 96.6 kg (213 lb).      ______________________________________________________________________    Interval History   Doing well, lying in bed.  No complaints.  Continue stroke work-up    Data reviewed today: I reviewed all medications, new labs and imaging results over the last 24 hours. I personally reviewed no images or EKG's today.    Physical Exam   Vital Signs: Temp: 98  F (36.7  C) Temp src: Oral BP: 133/87 Pulse: 86   Resp: 16 SpO2: 97 % O2 Device: None (Room air)    Weight: 0 lbs 0 oz  General Appearance: Well appearing for stated age.  Respiratory: " CTAB, no rales or ronchi  Cardiovascular: S1, S2 normal, no murmurs  GI: non-tender on palpation, BS present  Neuro:     Data   Recent Labs   Lab 02/18/22  0838 02/17/22  2101 02/17/22  1837 02/17/22  1654 02/17/22  0634 02/17/22  0435 02/16/22  2204   WBC 3.1*  --   --   --   --   --  3.9*   HGB 11.5*  --   --   --   --   --  13.2   *  --   --   --   --   --  103*     --   --   --   --   --  296   INR  --   --   --   --   --   --  1.63*     --   --   --   --   --  139   POTASSIUM 3.4  --  3.8  --   --  2.9* 3.1*   CHLORIDE 106  --   --   --   --   --  103   CO2 25  --   --   --   --   --  25   BUN 7  --   --   --   --   --  5*   CR 0.76  --   --   --   --   --  0.84   ANIONGAP 6  --   --   --   --   --  11   NATALIE 8.6  --   --   --   --   --  8.7   GLC 98 103*  --  90   < >  --  105   ALBUMIN  --   --   --   --   --   --  3.5   PROTTOTAL  --   --   --   --   --   --  6.9   BILITOTAL  --   --   --   --   --   --  0.5   ALKPHOS  --   --   --   --   --   --  73   ALT  --   --   --   --   --   --  22   AST  --   --   --   --   --   --  20    < > = values in this interval not displayed.

## 2022-02-18 NOTE — PROGRESS NOTES
Pt here with R MCA. A&Ox4. Neuros intact, ex slightly slurred speech. VSS on RA. Tele NSR. Regular diet, thin liquids. Takes pills whole with water. Up with A1 GB/W1. Denies pain. Pt scoring green on the Aggression Stop Light Tool. Plan for CT. Discharge pending, home.

## 2022-02-18 NOTE — CONSULTS
[facility]    Vascular Medicine Consultation     Date of Admission:  2/17/2022  Date of Consult (When I saw the patient): 02/18/22      Physician Attestation     I, Shraddha Jewell, saw and evaluated Leydi Atkinson as part of a shared APRN/PA visit.     We were asked by Dr. Mcgill to evaluate and consider a hypercoagulable work-up in this 66 year old female with a history of breast cancer presently on a clinical trial with a history of SCV thrombus and PE on anticoagulation with Xarelto now presenting with CVA.     She admits taking Xarelto on regular basis and last dose was day before yesterday in the morning  She is currently taking aspirin and Plavix  CT chest abdomen and pelvis no evidence of malignancy    I personally reviewed the vital signs, medications, labs and imaging.    I personally performed the substantive portion of the medical decision making for this visit - please see the LETTY's documentation for full details.      Key management decisions made by me and carried out under my direction:     Given her breast cancer history and enrolled in a clinical study we will ask hematology oncology consultation  This is unusual to develop stroke while on Xarelto?  Given malignancy history ideally she will benefit getting APLA tests but she was on Xarelto which can give false results.  May consider hypercoagulable studies tomorrow that would be 72 hours of last dose of Xarelto.  (Lupus anticoagulant, beta-2 glycoprotein antibodies, ACLA antibodies, Antithrombin III etc.)  Restart the anticoagulation per neurology service and may consider IV heparin initially then followed by switch to Eliquis since she developed stroke while on Xarelto (we will leave it to oncology service for specific recommendations)  Oncology service to decide letrozole versus other options , unlikely her stroke related to letrozole less than 1% of the people develop thrombotic risk.  She has a mildly  positive bubble study but no DVT in both legs doubt this is paradoxical emboli.    Thank you for the consultation !  This note was dictated by utilizing Dragon software  Copy of this note to hospitalist service and primary care physician    Vascular medicine service will sign off, please call us with any questions.  [phone]       Shraddha Jewell MD, CECE, 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 She has been compliant with Xarelto to date.   -She has a history of breast cancer and is presently on Letrozole since 2019 and in a randomized clinical trial since 1/2020.  -Echocardiogram this admission with PFO on bubble study. Venous duplex was negative for DVT.   -CT chest/abdomen/pelvis was negative for malignancy.       Plan:   -Will ask Hematology/Oncology to evaluate and weigh in on any hypercoagulable work-up given her cancer history and currently being enrolled in a clinical trial. Her Oncologist is with [facility]. Will have [facility] see her this admission.   -Will leave restarting anticoagulation to Neurology and Oncology.       Reason for Consult   Reason for consult: Asked by Dr. Mcgill to evaluate and consider a hypercoagulable work-up in this 66 year old female with a history of breast cancer presently on a clinical trial with a history of SCV thrombus and PE on anticoagulation with Xarelto presenting with CVA.     Primary Care Physician   [facility]      History of Present Illness   Leydi Atkinson is a 66 year old female with a history of breast cancer s/p bilateral partial mastectomy, chemotherapy and radiation in 2019. She has been on Letrozole since 2019 and in a  randomized clinical trial since 1/2020. In 4/2021 she was noted to have a SVC thrombus adjacent to her left sided port and left lower lobe segmental branch vessel acute PE. She underwent lytic therapy by IR with complete lysis of the clot and her port was removed. She was placed on Xarelto and has continued on this until present day. She was last seen by her Oncologist at [facility] on 2/7/22.    She now presented to [facility] emergency department on 2/16/2021 for evalution of new dysarthria that was initially noted by the patients family the night prior (2/15). MR imaging was obtained which revealed a R MCA stroke. Patient was transferred to [facility] for further medical management. On exam patient demonstrated continued symptoms with a L facial droop, mild dysarthria, and L sided neglect. Patient reports she has not missed any recent doses of her Xarelto. She does not endorse recent abrupt weight loss, change in appetite, night sweats, fevers, chills.      Past Medical History   Past Medical History:   Diagnosis Date     Asthma      Depression        Past Surgical History   Past Surgical History:   Procedure Laterality Date     LAPAROSCOPIC REMOVAL VAGAL NERVE BLOCKER  9/16/2013    Procedure: LAPAROSCOPIC REMOVAL VAGAL NERVE BLOCKER;  Laparoscopic Removal Of Vagal Nerve Leads And Subcutaneous Neuromodulator ;  Surgeon: Sigifrdeo Bernabe MD;  Location: [facility] OR     TUBAL LIGATION       uterine fibroids[       VBLOC[         Prior to Admission Medications   Prior to Admission Medications   Prescriptions Last Dose Informant Patient Reported? Taking?   NONFORMULARY 2/16/2022 at Unknown time  Yes Yes   Sig: Investigational drug name : ribociclib 200mg tablet     Drug study name : JOEL study    Take 400 mg by mouth daily. Take 2 tabs (400mg) by mouth daily in the morning with 8oz glass of water for 21 days.  Then off 7 days   albuterol (2.5 MG/3ML) 0.083% nebulizer solution PRN  Yes Yes   Sig: Take 1 ampule  by nebulization every 6 hours as needed.   albuterol (PROAIR HFA) 108 (90 BASE) MCG/ACT inhaler PRN  Yes Yes   Sig: Inhale 2 puffs into the lungs every 6 hours as needed.   buPROPion (WELLBUTRIN XL) 150 MG 24 hr tablet PRN  Yes Yes   Sig: Take 150 mg by mouth daily as needed (fatigue)   gabapentin (NEURONTIN) 300 MG capsule 2/16/2022 at Unknown time  Yes Yes   Sig: Take 300 mg by mouth 2 times daily   letrozole (FEMARA) 2.5 MG tablet 2/16/2022 at potassium   Yes Yes   Sig: Take 2.5 mg by mouth daily   potassium chloride ER (KLOR-CON M) 20 MEQ CR tablet 2/16/2022 at Unknown time  Yes Yes   Sig: Take 20 mEq by mouth daily   rivaroxaban ANTICOAGULANT (XARELTO) 20 MG TABS tablet 2/16/2022 at Unknown time  Yes Yes   Sig: Take 20 mg by mouth daily (with dinner)   traZODone (DESYREL) 100 MG tablet PRN  Yes Yes   Sig: Take 100 mg by mouth nightly as needed for sleep   venlafaxine (EFFEXOR-XR) 75 MG 24 hr capsule 2/16/2022 at Unknown time  Yes Yes   Sig: Take 150 mg by mouth daily       Facility-Administered Medications: None     Allergies   Allergies   Allergen Reactions     Sulfa Drugs        Social History   Leydi Atkinson  reports that she has never smoked. She does not have any smokeless tobacco history on file. She reports current alcohol use.    Family History    reviewed , non contributory     Review of Systems   The 10 point Review of Systems is negative other than noted in the HPI or here.     Physical Exam   Temp: 98.4  F (36.9  C) Temp src: Oral BP: 135/78 Pulse: 81   Resp: 16 SpO2: 97 % O2 Device: None (Room air)    Vital Signs with Ranges  Temp:  [98.3  F (36.8  C)-98.7  F (37.1  C)] 98.4  F (36.9  C)  Pulse:  [81-97] 81  Resp:  [16] 16  BP: (102-139)/(59-78) 135/78  SpO2:  [94 %-98 %] 97 %  0 lbs 0 oz    Constitutional: awake, alert, cooperative, no apparent distress, and appears stated age  Eyes: Lids and lashes normal, pupils equal, round and reactive to light, extra ocular muscles intact, sclera clear,  conjunctiva normal  ENT: normocepalic, without obvious abnormality, oropharynx pink and moist  Hematologic / Lymphatic: no lymphadenopathy  Respiratory: No increased work of breathing, good air exchange, clear to auscultation bilaterally, no crackles or wheezing  Cardiovascular: regular rate and rhythm, normal S1 and S2 and no murmur noted  GI: Normal bowel sounds, soft, non-distended, non-tender  Skin: no redness, warmth, or swelling, no rashes and no lesions  Musculoskeletal: There is no redness, warmth, or swelling of the joints.  Full range of motion noted.  Motor strength is 5 out of 5 all extremities bilaterally.  Tone is normal.  Neurologic: Awake, alert, oriented to name, place and time. Positive for facial asymmetry   Neuropsychiatric:  Normal affect, memory, insight.  Pulses:  No carotid bruits appreciated.     Data   Most Recent 3 CBC's:  Recent Labs   Lab Test 02/18/22 0838 02/16/22 2204   WBC 3.1* 3.9*   HGB 11.5* 13.2   * 103*    296     Most Recent 3 BMP's:  Recent Labs   Lab Test 02/18/22 0838 02/17/22  2101 02/17/22  1837 02/17/22  1654 02/17/22  0634 02/17/22  0435 02/16/22 2204     --   --   --   --   --  139   POTASSIUM 3.4  --  3.8  --   --  2.9* 3.1*   CHLORIDE 106  --   --   --   --   --  103   CO2 25  --   --   --   --   --  25   BUN 7  --   --   --   --   --  5*   CR 0.76  --   --   --   --   --  0.84   ANIONGAP 6  --   --   --   --   --  11   NATALIE 8.6  --   --   --   --   --  8.7   GLC 98 103*  --  90   < >  --  105    < > = values in this interval not displayed.     Most Recent 3 INR's:  Recent Labs   Lab Test 02/16/22 2204   INR 1.63*     Most Recent Cholesterol Panel:  Recent Labs   Lab Test 02/17/22 0435   CHOL 178   *   HDL 31*   TRIG 168*     Most Recent Hemoglobin A1c:  Recent Labs   Lab Test 02/17/22 0435   A1C 5.0

## 2022-02-18 NOTE — PROGRESS NOTES
"Since seeing pt yesterday, many outstanding tests, will see pt again once completed, likely tomorrow    - CT c/a/p pending  - Echo shows PFO, ordered LE duplex, consider MRV pelvis depending on duplex and CT results  - Vascular med consult pending    Britni Sneed PA-C  Vascular Neurology  02/18/2022 12:38 PM  Securely message with the Vocera Web Console (learn more here)  To page me or covering stroke neurology team member, click here: AMCOM  Choose \"On Call\" tab at top, then search dropdown box for \"Neurology Adult\" & press Enter, look for Neuro ICU/Stroke    "

## 2022-02-19 ENCOUNTER — APPOINTMENT (OUTPATIENT)
Dept: SPEECH THERAPY | Facility: CLINIC | Age: 67
DRG: 064 | End: 2022-02-19
Attending: INTERNAL MEDICINE
Payer: COMMERCIAL

## 2022-02-19 ENCOUNTER — APPOINTMENT (OUTPATIENT)
Dept: CT IMAGING | Facility: CLINIC | Age: 67
DRG: 064 | End: 2022-02-19
Attending: PHYSICIAN ASSISTANT
Payer: COMMERCIAL

## 2022-02-19 LAB
AT III ACT/NOR PPP CHRO: 96 % (ref 85–135)
MAGNESIUM SERPL-MCNC: 2.1 MG/DL (ref 1.6–2.3)
POTASSIUM BLD-SCNC: 3.5 MMOL/L (ref 3.4–5.3)

## 2022-02-19 PROCEDURE — 36415 COLL VENOUS BLD VENIPUNCTURE: CPT | Performed by: HOSPITALIST

## 2022-02-19 PROCEDURE — 83735 ASSAY OF MAGNESIUM: CPT | Performed by: HOSPITALIST

## 2022-02-19 PROCEDURE — 84132 ASSAY OF SERUM POTASSIUM: CPT | Performed by: HOSPITALIST

## 2022-02-19 PROCEDURE — 99233 SBSQ HOSP IP/OBS HIGH 50: CPT | Performed by: PSYCHIATRY & NEUROLOGY

## 2022-02-19 PROCEDURE — 92526 ORAL FUNCTION THERAPY: CPT | Mod: GN

## 2022-02-19 PROCEDURE — 250N000013 HC RX MED GY IP 250 OP 250 PS 637: Performed by: PHYSICIAN ASSISTANT

## 2022-02-19 PROCEDURE — 120N000001 HC R&B MED SURG/OB

## 2022-02-19 PROCEDURE — 70450 CT HEAD/BRAIN W/O DYE: CPT

## 2022-02-19 PROCEDURE — 99233 SBSQ HOSP IP/OBS HIGH 50: CPT | Performed by: INTERNAL MEDICINE

## 2022-02-19 PROCEDURE — 99232 SBSQ HOSP IP/OBS MODERATE 35: CPT | Performed by: HOSPITALIST

## 2022-02-19 PROCEDURE — 250N000013 HC RX MED GY IP 250 OP 250 PS 637: Performed by: PSYCHIATRY & NEUROLOGY

## 2022-02-19 RX ADMIN — APIXABAN 5 MG: 5 TABLET, FILM COATED ORAL at 20:03

## 2022-02-19 RX ADMIN — ATORVASTATIN CALCIUM 80 MG: 80 TABLET, FILM COATED ORAL at 20:03

## 2022-02-19 RX ADMIN — APIXABAN 5 MG: 5 TABLET, FILM COATED ORAL at 14:03

## 2022-02-19 RX ADMIN — ASPIRIN 81 MG: 81 TABLET, COATED ORAL at 08:53

## 2022-02-19 RX ADMIN — CLOPIDOGREL BISULFATE 75 MG: 75 TABLET ORAL at 08:53

## 2022-02-19 ASSESSMENT — ACTIVITIES OF DAILY LIVING (ADL)
ADLS_ACUITY_SCORE: 4

## 2022-02-19 NOTE — PLAN OF CARE
Speech Language Therapy Discharge Summary    Reason for therapy discharge:    All goals and outcomes met, no further needs identified.    Progress towards therapy goal(s). See goals on Care Plan in [facility] electronic health record for goal details.  Goals met    Therapy recommendation(s):    No further therapy is recommended.  Patient is tolerating a regular diet with thin liquids without s/sx of aspiration or changes in swallow function. Pt reports that her speech/language is baseline and her family also reports improvements. Encouraged OP speech therapy should higher level speech and cognitive deficits become apparent at discharge.

## 2022-02-19 NOTE — PLAN OF CARE
Pt here with R MCA CVA. A&O x4. Neuros with L facial droop, slightly slurred speech, and numbess in hands and feet at baseline. VSS on RA. No B/P on R arm d/t masectomy. Tele NSR. Regular diet, thin liquids. Takes pills whole. Up with A1GBW. Denies pain. Pt scoring green on the Aggression Stop Light Tool. Plan for repeat CT this morning. Hem-Onc and vascular consult today. Discharge pending.

## 2022-02-19 NOTE — PROVIDER NOTIFICATION
MD Notification    Notified Person: MD    Notified Person Name: Cirilo Hogue     Notification Date/Time: 1822 2/18/22    Notification Interaction: [id number] AMOS    Pupils noted to be +4 dilated, slow to respond.  Previously brisk/equal.  Please advise, thanks.    Tashia RN  [phone]    Purpose of Notification:    Orders Received: No response, will CTM    Comments:

## 2022-02-19 NOTE — PLAN OF CARE
2621-1606: Pt here with R MCA CVA. A&Ox4. Neuros show L facial droop, speech clear this shift. VSS on room air. Tele SR. Regular diet, thin liquids. Takes pills whole with water. Up with Ax1, GB, walker. Voiding in bathroom. Denies pain. Pt scoring green on the Aggression Stop Light Tool. Plan for discharge home with daughter pending Oncology blood workup.

## 2022-02-19 NOTE — PROGRESS NOTES
"[facility]    Hematology/Oncology Progress Note      Date of Admission:  2/17/2022  Date of Consult:  02/18/22  Reason for Consult: history of breast cancer, and DVT       ASSESSMENT/PLAN:  Leydi Atkinson is a 66 year old female with history of breast cancer in 2019 currently being followed by Dr. Chacon at [facility] and on adjuvant letrozole.  She presented with an acute stroke on Xarelto.  Bubble study positive    CT of the chest abdomen pelvis does not reveal any evidence of malignancy; full blood hypercoag workup pending at this time.  BLE US negative.  Repeat evaluation of SVC thrombosis at [facility] showed resolution on venogram.    With no active areas of thrombosis, question is whether paradoxical is actually contributing or whether more common causes (plaque, CV risk factors) could be at play.  Fortunately, she is recovering well.  Agree w/ DOAC when able to resume.  Do not suspect this directly relates to letrozole although AIs long-term carry some risk of CVA through vascular damage / plaque formation.    Will continue to follow along.    Jorge A Sanford MD.    HISTORY OF PRESENT ILLNESS:   Feels great today.  No major concerns.  CVA related SE are near gone.  No hedaches, nausea, vomiting.  No new symptoms.    PHYSICAL EXAM:  Vital signs:  Temp: 98.3  F (36.8  C) Temp src: Oral BP: 102/73 Pulse: 96   Resp: 16 SpO2: 93 % O2 Device: None (Room air)        Estimated body mass index is 36.56 kg/m  as calculated from the following:    Height as of 2/16/22: 1.626 m (5' 4\").    Weight as of 2/16/22: 96.6 kg (213 lb).    GENERAL/CONSTITUTIONAL: No acute distress.    LABS:  CBC RESULTS:   Recent Labs   Lab Test 02/18/22  0838   WBC 3.1*   RBC 3.22*   HGB 11.5*   HCT 34.1*   *   MCH 35.7*   MCHC 33.7   RDW 14.4          Recent Labs   Lab Test 02/18/22  0838 02/17/22  2101 02/17/22  1837 02/17/22  0435 02/16/22  2204     --   --   --  139   POTASSIUM 3.4  --  " 3.8   < > 3.1*   CHLORIDE 106  --   --   --  103   CO2 25  --   --   --  25   ANIONGAP 6  --   --   --  11   GLC 98 103*  --    < > 105   BUN 7  --   --   --  5*   CR 0.76  --   --   --  0.84   NATALIE 8.6  --   --   --  8.7    < > = values in this interval not displayed.

## 2022-02-19 NOTE — PROGRESS NOTES
[facility]    Stroke Progress Note    Interval EventsFeeling better today. Repeat head CT today shows expected evolution of infarcts.    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 MRI with moderate size R hemispheric (temporal, insula, parietal, posterior frontal, corona radiata) ischemic stroke. MRA with distal M1 occlusion, possible diffuse narrowing of R CCA/ICA.     MRI/Head CT MRI brain 2/16/22: Right MCA territory acute/subacute infarction without hemorrhagic conversion. Mild associated mass effect is present, however there is no midline shift   Intracranial Vasculature MRA head 2/16/22:  distal right M1 middle cerebral artery occlusion     CTA head 2/17/22: distal right M1 segment involving the  proximal M2 branches   Cervical Vasculature MRA neck 2/16/22: Mild diffuse narrowing of the right common carotid artery and right cervical ICA     CTA neck 2/17/22: unrevealing       Echocardiogram TTE: EF 60-65%, bubble study mildly positive   EKG/Telemetry SR    Other Testing CT c/a/p: no evidence of malignancy  LE duplex: negative      LDL  2/17/2022: 113 mg/dL   A1C  2/17/2022: 5.0 %   Troponin 2/17/2022: 8 ng/L          Impression   1. Acute ischemic stroke of the R MCA territory due to embolic stroke of undetermined source (ESUS) breakthrough while on Xarelto    2. Spatial neglect syndrome    3. PFO, no further workup or testing required    4. h/o cancer, PE/SVC thrombus on Xarelto     Plan  - Restart anticoagulation today with eliquis, given that she had breakthrough stroke while on Xarelto.  -Atorvastatin 80, recommend re-check LDL in 2-3 months & de-escalate PRN to achieve LDL goal of 40-70  -30d cardiac monitor on discharge  - due to her spatial neglect syndrome she should not drive and should  "have outpatient formal visual field testing and PT/OT with specific attention to her neglect    Patient Follow-up    - in 6-8 weeks with [facility] or other local neurologist of patient's choice    No further stroke evaluation is recommended, so we will sign off. Please contact us with any additional questions.    Britni Sneed PA-C  Neurology  02/19/2022 4:52 PM  To page me or covering stroke neurology team member, click here: AMCOM   Choose \"On Call\" tab at top, then search dropdown box for \"Neurology Adult\", select location, press Enter, then look for stroke/neuro ICU/telestroke.    ______________________________________________________    Clinically Significant Risk Factors Present on Admission                 Medications   Scheduled Meds    apixaban ANTICOAGULANT  5 mg Oral BID     atorvastatin  80 mg Oral QPM     sodium chloride (PF)  3 mL Intracatheter Q8H       Infusion Meds    - MEDICATION INSTRUCTIONS -       - MEDICATION INSTRUCTIONS -       - MEDICATION INSTRUCTIONS -         PRN Meds  hydrOXYzine, lidocaine 4%, lidocaine (buffered or not buffered), - MEDICATION INSTRUCTIONS -, - MEDICATION INSTRUCTIONS -, melatonin, ondansetron **OR** ondansetron, - MEDICATION INSTRUCTIONS -, sodium chloride (PF)       PHYSICAL EXAMINATION  Temp:  [98.3  F (36.8  C)-98.8  F (37.1  C)] 98.7  F (37.1  C)  Pulse:  [79-96] 89  Resp:  [16] 16  BP: (102-136)/(64-73) 106/64  SpO2:  [92 %-98 %] 98 %      Neurologic  Mental Status:  alert, oriented x 3, follows commands, speech clear and fluent, naming and repetition normal  Cranial Nerves:  PERRL, EOMI with normal smooth pursuit, facial sensation intact and symmetric, hearing not formally tested but intact to conversation, palate elevation symmetric and uvula midline, no dysarthria, shoulder shrug strong bilaterally, tongue protrusion midline, mild L facial droop, visual fields full to confrontation but occasional visual neglect on L with double " simultaneous stimulation  Motor:  normal muscle tone and bulk, no abnormal movements, able to move all limbs spontaneously, very mild L hemiparesis  Reflexes:  deferred  Sensory:  light touch sensation intact and symmetric throughout upper and lower extremities, occasional sensory exitinction to double simultaneous stimulation on the L  Coordination:  normal finger-to-nose and heel-to-shin bilaterally without dysmetria, rapid alternating movements symmetric  Station/Gait:  deferred      Imaging  I personally reviewed all imaging; relevant findings per HPI.     Lab Results Data   CBC  Recent Labs   Lab 02/18/22 0838 02/16/22 2204   WBC 3.1* 3.9*   RBC 3.22* 3.59*   HGB 11.5* 13.2   HCT 34.1* 37.1    296     Basic Metabolic Panel    Recent Labs   Lab 02/19/22  0751 02/18/22 0838 02/17/22  2101 02/17/22  1837 02/17/22  1654 02/17/22 0435 02/16/22 2204   NA  --  137  --   --   --   --  139   POTASSIUM 3.5 3.4  --  3.8  --    < > 3.1*   CHLORIDE  --  106  --   --   --   --  103   CO2  --  25  --   --   --   --  25   BUN  --  7  --   --   --   --  5*   CR  --  0.76  --   --   --   --  0.84   GLC  --  98 103*  --  90   < > 105   NATALIE  --  8.6  --   --   --   --  8.7    < > = values in this interval not displayed.     Liver Panel  Recent Labs   Lab 02/16/22 2204   PROTTOTAL 6.9   ALBUMIN 3.5   BILITOTAL 0.5   ALKPHOS 73   AST 20   ALT 22     INR    Recent Labs   Lab Test 02/16/22 2204   INR 1.63*      Lipid Profile    Recent Labs   Lab Test 02/17/22 0435   CHOL 178   HDL 31*   *   TRIG 168*     A1C    Recent Labs   Lab Test 02/17/22 0435   A1C 5.0     Troponin I  No results for input(s): TROPONIN, GHTROP in the last 168 hours.

## 2022-02-19 NOTE — PLAN OF CARE
Pt here with R MCA stroke. A&O x4. Neuros left facial droop, sluggish PERRLA, baseline numbness/tingling in extremities from neuropathy, BLE edema non-pitting. VSS.  Tele SR with inverted T-wave. Regular diet, thin liquids. Takes pills whole with water. Up with A1 GBW. Denies pain. Pt scoring green on the Aggression Stop Light Tool. Plan for hem-onc and vascular consult tomorrow.

## 2022-02-19 NOTE — PROGRESS NOTES
[facility]    Medicine Progress Note - Hospitalist Service    Date of Admission:  2/17/2022    Assessment & Plan     Assessment & Plan     Leydi Atkinson is a 66 year old female with past medical history significant for breast cancer and prior pulmonary embolism. admitted on 2/17/2022 with acute stroke.      Acute ischemic stroke of the right MCA territory  Suspected right ICA dissection  Pt presented to the ED with >24 hours of slurred speech, balance issues and left sided facial droop. MRI obtained and showed R superior MCA acute infarction. MRA of the neck showed long segment stenosis of the R ICA from its origin to the base of the skill and MRA of the brain showed R distal MCA occlusion.  -- Stroke consulted - appreciated  --Recommendation for aspirin Plavix for the next 2 to 4 days due to the size of the stroke.  --CT chest abdomen and pelvis to look for evidence of malignancy - negative  --Echo with bubble study - reveals possible PFO  --Neurology recommendations to consult vascular medicine due to patient having a history of DVT PE on Xarelto, now presenting with stroke.  Asking for any further hypercoagulability work-up that needs to be done.  --Continue atorvastatin 80 mg.  -- Continue cardiac monitor for 30 days on discharge.   -- Neurochecks and Vital Signs every 4 hours   --Plan on PTA Xarelto for now per neuro recs.  -- vascular medicine consult to assist with hypercoagulability work up given patients hx of cancer, DVT/PE - input appreciated  -- Heme/onc consult - appreciated. May resume DOAC when ok by neurology . Started on hypercoagulable work up      Hypokalemia  Hypomagnesemia   Potassium 3.1, magnesium 1.6 on admission.   - Replace per protocol    Elevated INR  INR = 1.63 (Ref range: 0.85 - 1.15) Possibly nutritional.   -- will consider giving vitamin K if not improving.   - Monitor      Hx of breast cancer   T1c, N3, M0 infiltrating ductal carcinoma of the right  "breast, ER/TX positive, her-2 nicolas negative. SP mastectomy, chemotherapy and radiation treatments. She is currently enrolled in the JOEL trial.  - She remains on Letrozole 2.5mg daily and study drug (ribociclib) 400mg BID - hold while admitted to the hospital.      Hx of pulmonary Embolism (4/2021)   - Continue Rivaroxaban 20mg daily     Hx of Monoclonal B cell lymphocytosis, lambda restricted.   Extensive lab testing at [facility] detected 3% monoclonal B cell lymphocyte  - Not currently on any treatment for this.      Obesity: Estimated body mass index is 36.56 kg/m  as calculated from the following:    Height as of 2/16/22: 1.626 m (5' 4\").    Weight as of 2/16/22: 96.6 kg (213 lb)       Diet: Regular Diet Adult    DVT Prophylaxis: Pneumatic Compression Devices  Vera Catheter: Not present  Central Lines: None  Cardiac Monitoring: ACTIVE order. Indication: Stroke, acute (48 hours)  Code Status: Full Code      Disposition Plan   Expected Discharge: 02/20/2022     Anticipated discharge location: home with family;home with help/services    Delays:            The patient's care was discussed with the Patient.    Gail Livingston MD  Hospitalist Service  [facility]  Securely message with the Vocera Web Console (learn more here)  Text page via [employer] Paging/Directory         Clinically Significant Risk Factors Present on Admission              # Obesity: Estimated body mass index is 36.56 kg/m  as calculated from the following:    Height as of 2/16/22: 1.626 m (5' 4\").    Weight as of 2/16/22: 96.6 kg (213 lb).      ______________________________________________________________________    Interval History   Doing well, lying in bed.  No complaints.  stroke work-up ongoing.     Data reviewed today: I reviewed all medications, new labs and imaging results over the last 24 hours. I personally reviewed no images or EKG's today.    Physical Exam   Vital Signs: Temp: 98.3  F (36.8  C) Temp src: Oral " BP: 102/73 Pulse: 96   Resp: 16 SpO2: 93 % O2 Device: None (Room air)    Weight: 0 lbs 0 oz  General Appearance: Well appearing for stated age.  Respiratory: CTAB, no rales or ronchi  Cardiovascular: S1, S2 normal, no murmurs  GI: non-tender on palpation, BS present  Neuro:     Data   Recent Labs   Lab 02/19/22  0751 02/18/22  0838 02/17/22  2101 02/17/22  1837 02/17/22  1654 02/17/22  0435 02/16/22  2204   WBC  --  3.1*  --   --   --   --  3.9*   HGB  --  11.5*  --   --   --   --  13.2   MCV  --  106*  --   --   --   --  103*   PLT  --  322  --   --   --   --  296   INR  --   --   --   --   --   --  1.63*   NA  --  137  --   --   --   --  139   POTASSIUM 3.5 3.4  --  3.8  --    < > 3.1*   CHLORIDE  --  106  --   --   --   --  103   CO2  --  25  --   --   --   --  25   BUN  --  7  --   --   --   --  5*   CR  --  0.76  --   --   --   --  0.84   ANIONGAP  --  6  --   --   --   --  11   NATALIE  --  8.6  --   --   --   --  8.7   GLC  --  98 103*  --  90   < > 105   ALBUMIN  --   --   --   --   --   --  3.5   PROTTOTAL  --   --   --   --   --   --  6.9   BILITOTAL  --   --   --   --   --   --  0.5   ALKPHOS  --   --   --   --   --   --  73   ALT  --   --   --   --   --   --  22   AST  --   --   --   --   --   --  20    < > = values in this interval not displayed.

## 2022-02-19 NOTE — CONSULTS
Care Management Initial Consult    General Information  Assessment completed with: sydnee Winchester  Type of CM/SW Visit: Initial Assessment    Primary Care Provider verified and updated as needed: Yes   Readmission within the last 30 days: no previous admission in last 30 days      Reason for Consult: discharge planning  Advance Care Planning: Advance Care Planning Reviewed: no concerns identified        Communication Assessment  Patient's communication style: spoken language (English or Bilingual)    Hearing Difficulty or Deaf: no   Wear Glasses or Blind: yes    Cognitive  Cognitive/Neuro/Behavioral: WDL  Level of Consciousness: alert  Arousal Level: opens eyes spontaneously  Orientation: oriented x 4  Mood/Behavior: calm, cooperative  Best Language: 0 - No aphasia  Speech: clear, spontaneous    Living Environment:   People in home: alone, child(dusty), adult     Current living Arrangements: house      Able to return to prior arrangements: yes       Family/Social Support:  Care provided by: self  Provides care for: no one  Marital Status: Single  Children          Description of Support System: Supportive, Involved    Support Assessment: Adequate social supports, Adequate family and caregiver support    Current Resources:   Patient receiving home care services: No     Community Resources:    Equipment currently used at home: shower chair (Has walker and cane but not using)  Supplies currently used at home:      Employment/Financial:  Employment Status: employed full-time     Financial Concerns:       Lifestyle & Psychosocial Needs:  Social Determinants of Health     Tobacco Use: Unknown     Smoking Tobacco Use: Never Smoker     Smokeless Tobacco Use: Unknown   Alcohol Use: Not on file   Financial Resource Strain: Not on file   Food Insecurity: Not on file   Transportation Needs: Not on file   Physical Activity: Not on file   Stress: Not on file   Social Connections: Not on file   Intimate Partner Violence: Not on file    Depression: Not on file   Housing Stability: Not on file     Functional Status:  Prior to admission patient needed assistance:  Mental Health Status:  Chemical Dependency Status:  Values/Beliefs:  Spiritual, Cultural Beliefs, Zoroastrian Practices, Values that affect care:               Additional Information:  Per care management/ social work consult for discharge planning, SW met with patient. Patient was admitted to inpatient on 2/17 for acute stroke due to ischemia and has an expected discharge date at 2/20. Patient stated she plans on going home with one of her daughters and they will stay with her throughout her recovery. Daughter will transport upon discharge. Patient is agreeable to home care and does not have a preference for agency.     CC will continue to follow.     MARQUES Interiano, DENNISSW

## 2022-02-20 ENCOUNTER — APPOINTMENT (OUTPATIENT)
Dept: CARDIOLOGY | Facility: CLINIC | Age: 67
DRG: 064 | End: 2022-02-20
Attending: PHYSICIAN ASSISTANT
Payer: COMMERCIAL

## 2022-02-20 VITALS
SYSTOLIC BLOOD PRESSURE: 128 MMHG | OXYGEN SATURATION: 96 % | RESPIRATION RATE: 16 BRPM | HEART RATE: 69 BPM | DIASTOLIC BLOOD PRESSURE: 56 MMHG | TEMPERATURE: 98.3 F

## 2022-02-20 PROCEDURE — 99239 HOSP IP/OBS DSCHRG MGMT >30: CPT | Performed by: HOSPITALIST

## 2022-02-20 PROCEDURE — 93270 REMOTE 30 DAY ECG REV/REPORT: CPT

## 2022-02-20 PROCEDURE — 250N000013 HC RX MED GY IP 250 OP 250 PS 637: Performed by: PSYCHIATRY & NEUROLOGY

## 2022-02-20 PROCEDURE — 93272 ECG/REVIEW INTERPRET ONLY: CPT | Performed by: INTERNAL MEDICINE

## 2022-02-20 RX ORDER — ATORVASTATIN CALCIUM 80 MG/1
80 TABLET, FILM COATED ORAL EVERY EVENING
Qty: 30 TABLET | Refills: 0 | Status: SHIPPED | OUTPATIENT
Start: 2022-02-20 | End: 2022-03-22

## 2022-02-20 RX ADMIN — APIXABAN 5 MG: 5 TABLET, FILM COATED ORAL at 09:59

## 2022-02-20 ASSESSMENT — ACTIVITIES OF DAILY LIVING (ADL)
ADLS_ACUITY_SCORE: 8
ADLS_ACUITY_SCORE: 4
ADLS_ACUITY_SCORE: 4
ADLS_ACUITY_SCORE: 8
ADLS_ACUITY_SCORE: 4
ADLS_ACUITY_SCORE: 8
ADLS_ACUITY_SCORE: 4

## 2022-02-20 NOTE — DISCHARGE INSTRUCTIONS
Your risk factors for stroke or TIA (transient ischemic attack):    Your Risk Factors Your Results Normal Ranges   High blood pressure BP Readings from Last 1 Encounters:   02/20/22 128/56    Less than 120/80   Cholesterol              Total Lab Results   Component Value Date    CHOL 178 02/17/2022      Less than 150    Triglycerides   Lab Results   Component Value Date    TRIG 168 02/17/2022    Less than 150   LDL Lab Results   Component Value Date     02/17/2022       Less than 70   HDL Lab Results   Component Value Date    HDL 31 02/17/2022            Greater than 40 (men)  Greater than 50 (women)   Diabetes Recent Labs   Lab 02/18/22  0838   GLC 98    Fasting blood glucose    Smoking/tobacco use  Quit smoking and tobacco   Overweight  Lose 1-2 pounds a week   Lack of exercise  30 minutes moderate activity each day   Other risk factors include carotid (neck) artery disease, atrial fibrillation and stress. You may be on new medicine to treat high blood pressure, cholesterol, diabetes or atrial fibrillation.    Understanding Stroke Booklet given to patient. Please refer to booklet for further information.    Stroke warning signs and symptoms - CALL 911 right away for:  - Sudden numbness or weakness in the face, arm or leg (often on one side of the body).  - Sudden confusion or trouble understanding what is going on.  - Sudden blurred or decreased vision in one or both eyes.  - Sudden trouble speaking, loss of balance, dizziness or problems with coordination.  - Sudden, severe headache for no reason.  - Fainting or seizures.  - Symptoms may go away then come back suddenly.    FOLLOW UP -   Neurology - Britni Sneed PA-C and Mel Dyson-MD Noah  Recommendations:  -Eliquis  -Atorvastatin, re-check LDL cholesterol in 2-3 months  -30d cardiac monitor on discharge  -Recommended against driving until she has formal visual field testing & OT clearance   -Follow-up with [facility]  [phone] or local neurologist in 6-8 weeks.

## 2022-02-20 NOTE — PLAN OF CARE
Pt here with R MCA stroke. A&O x4. Neuros- PERRLA sluggish response to light, baseline numbness/tingling in all extremities, facial droop appears to have resolved.  BLE edema non-pitting. VSS on RA.  Tele SR with inverted T-wave. Regular diet, thin liquids. Takes pills whole with water. Up with A1 GBW. Denies pain. Pt scoring green on the Aggression Stop Light Tool.     Neuro has signed off.  Plan for possible discharge home tomorrow with home-care services.

## 2022-02-20 NOTE — PLAN OF CARE
Goal Outcome Evaluation:  Plan of Care Reviewed With: patient     Reason for Admission: R MCA CVA    Cognitive/Mentation: A/Ox 4  Neuros/CMS: Intact ex baseline numbness and tingling in hands and feet, sluggish pupils  VS: stable.   Tele: NSR with inverted T waves.  GI: BS +, + flatus. Continent.  : Continent.  Pulmonary: LS clear.  Pain: denies.     Drains: none  Skin: intact ex BLE non pitting edema  Activity: Assist x 1 with GB/W.  Diet: regular with thin liquids. Takes pills whole.     Therapies recs: home with home care  Discharge: today    End of shift summary: Pt stable overnight. Plan for discharge today, daughter will help with transportation

## 2022-02-20 NOTE — PLAN OF CARE
STroke. Neuros/CMS - alert & oriented, sluggish pupils, possible left visual neglect/glasses on & helpful; baseline numbness in hands and feet bilaterally. VSS. REgular diet/good appetite. Up with sba. Continent of bowel and bladder/bm today. Denies pain. Pt scoring green on the Aggression Stop Light Tool. Discharging home via daughter home with home care. AVS reviewed/prescriptions provided.  30-day monitor at discharge. Patient agrees with discharge plan/no unmet needs at this time.

## 2022-02-20 NOTE — PROGRESS NOTES
Care Management Discharge Note    Discharge Date: 02/20/2022       Discharge Disposition: Home    Discharge Services:  OP therapy    Discharge DME:      Discharge Transportation: family or friend will provide    Private pay costs discussed: Not applicable    Education Provided on the Discharge Plan:  Yes regarding f/u  Persons Notified of Discharge Plans:   Patient/Family in Agreement with the Plan: yes    Handoff Referral Completed: No    Additional Information:  Patient will discharge to home today.  Spoke with her and she stated her daughters are arranging to have someone stay with her 24/7.  They will help with transportation.  She will have OP therapy.  Updated [facility] and [facility].  No other CM needs anticipated.    Nicole Aly RN

## 2022-02-20 NOTE — PROVIDER NOTIFICATION
Paged Dr. Sanford from Hematology/Oncology re: discharge planning.  patient discharging, does she have your blessings to DC, do you recommend she follow up with you? Will await any additional orders.    MD phoned right back.  Will route information to patient primary oncologist.

## 2022-02-21 ENCOUNTER — PATIENT OUTREACH (OUTPATIENT)
Dept: CARE COORDINATION | Facility: CLINIC | Age: 67
End: 2022-02-21
Payer: COMMERCIAL

## 2022-02-21 DIAGNOSIS — Z71.89 OTHER SPECIFIED COUNSELING: ICD-10-CM

## 2022-02-21 LAB
DRVVT SCREEN RATIO: 0.93
INR PPP: 1.06 (ref 0.85–1.15)
LA PPP-IMP: NEGATIVE
LUPUS INTERPRETATION: NORMAL
PTT RATIO: 0.79
THROMBIN TIME: 17.4 SECONDS (ref 13–19)

## 2022-02-21 PROCEDURE — 85390 FIBRINOLYSINS SCREEN I&R: CPT | Mod: 26 | Performed by: PATHOLOGY

## 2022-02-21 NOTE — PLAN OF CARE
Occupational Therapy Discharge Summary    Reason for therapy discharge:    Discharged to home with outpatient therapy.    Progress towards therapy goal(s). See goals on Care Plan in [facility] electronic health record for goal details.  Goals not met.  Barriers to achieving goals:   discharge from facility.    Therapy recommendation(s):    Continued therapy is recommended.  Rationale/Recommendations:  Skilled OT to address safety and independence in I/ADLs.

## 2022-02-21 NOTE — PROGRESS NOTES
Clinic Care Coordination Contact  [facility]/Voicemail       Clinical Data: Care Coordinator Outreach  Outreach attempted x 1.  Left message on patient's voicemail with call back information and requested return call.  Plan: Care Coordinator will try to reach patient again in 1-2 business days.        YASMANI Mckeon  [phone]  [facility]

## 2022-02-21 NOTE — PLAN OF CARE
Physical Therapy Discharge Summary    Reason for therapy discharge:    Discharged to home with outpatient therapy.    Progress towards therapy goal(s). See goals on Care Plan in [facility] electronic health record for goal details.  Goals partially met.  Barriers to achieving goals:   discharge from facility.    Therapy recommendation(s):    Continued therapy is recommended.  Rationale/Recommendations:  Home with assist and OP PT to maximize return to PLOF.

## 2022-02-22 LAB
B2 GLYCOPROT1 IGG SERPL IA-ACNC: 1.4 U/ML
B2 GLYCOPROT1 IGM SERPL IA-ACNC: <2.4 U/ML
CARDIOLIPIN IGG SER IA-ACNC: <2 GPL-U/ML
CARDIOLIPIN IGG SER IA-ACNC: NEGATIVE
CARDIOLIPIN IGM SER IA-ACNC: <2 MPL-U/ML
CARDIOLIPIN IGM SER IA-ACNC: NEGATIVE

## 2022-02-22 NOTE — PROGRESS NOTES
Clinic Care Coordination Contact  [facility]/Voicemail       Clinical Data: Care Coordinator Outreach  Outreach attempted x 2.  Left message on patient's voicemail with call back information and requested return call.  Plan:  Care Coordinator will do no further outreaches at this time.    Rosaline Cummings  Community Health Worker  [facility]  [phone]

## 2022-02-24 PROCEDURE — 36415 COLL VENOUS BLD VENIPUNCTURE: CPT | Performed by: FAMILY MEDICINE

## 2022-02-28 PROCEDURE — G0452 MOLECULAR PATHOLOGY INTERPR: HCPCS | Mod: 26 | Performed by: PATHOLOGY

## 2022-03-24 ENCOUNTER — OFFICE VISIT (OUTPATIENT)
Dept: NEUROLOGY | Facility: CLINIC | Age: 67
End: 2022-03-24
Payer: COMMERCIAL

## 2022-03-24 VITALS
HEIGHT: 64 IN | BODY MASS INDEX: 38 KG/M2 | DIASTOLIC BLOOD PRESSURE: 78 MMHG | WEIGHT: 222.6 LBS | SYSTOLIC BLOOD PRESSURE: 116 MMHG | HEART RATE: 108 BPM

## 2022-03-24 DIAGNOSIS — Z86.73 HISTORY OF STROKE: Primary | ICD-10-CM

## 2022-03-24 DIAGNOSIS — R06.83 SNORING: ICD-10-CM

## 2022-03-24 DIAGNOSIS — I66.01 OCCLUSION AND STENOSIS OF RIGHT MIDDLE CEREBRAL ARTERY: ICD-10-CM

## 2022-03-24 PROCEDURE — 99215 OFFICE O/P EST HI 40 MIN: CPT | Performed by: PSYCHIATRY & NEUROLOGY

## 2022-03-24 NOTE — NURSING NOTE
Chief Complaint   Patient presents with     Stroke     New patient- Hospital follow up (2/16/2022-2/20/2022)     Refugio Gordon CMA@ on 3/24/2022 at 8:53 AM

## 2022-03-24 NOTE — PROGRESS NOTES
INITIAL NEUROLOGY CONSULTATION    DATE OF VISIT: 3/24/2022  MRN: [id number]  PATIENT NAME: Leydi Atkinson  YOB: 1955    REFERRING PROVIDER: Referred Self    Chief Complaint   Patient presents with     Stroke     New patient- Hospital follow up (2/16/2022-2/20/2022)       SUBJECTIVE:                                                      HPI:   Leydi Atkinson is a 66 year old female self-referred to follow-up after stroke.  The patient was admitted to [facility] last month with new onset dysarthria, left facial droop.  History of PE and SVC in 4.2021, patient was on Xarelto daily.  She has additional history of hyperlipidemia and breast cancer (in remission).  MRI showed right MCA territory stroke, MRA with distal M1 occlusion and diffuse narrowing of her right carotid artery.  LDL was 113.  No evidence of metastasis on CT of the chest abdomen and pelvis.  TTE showed a positive PFO.  Hypercoagulopathy labs were sent: Lupus panel, factor II and V, Antithrombin III, anticardiolipin, beta 2 glycoprotein antibodies were all negative/normal.  The patient was switched from Xarelto to Eliquis and started on high-dose atorvastatin.  She was also discharged with cardiac monitoring, which is currently pending.      Discharge exam recorded with some visual neglect on the left and neglect to double simultaneous stimulation, mild left-sided weakness    Leydi tells me that she feels back to normal.  She is not having any problems with her vision, no weakness.  She does endorse some neuropathy symptoms in the feet and hands that have been present since chemotherapy.  Symptoms are stable with gabapentin.    I ask about how she felt initially, what led to her presentation to the hospital.  She says she developed a headache, visual changes on the Left, and some chest pain that day.  She did not think much of these symptoms until she was talking to her daughter on the phone and they said she sounded drunk.  It  was then that she sought medical care.    No problems with side effects on the Eliquis.  She is also tolerating the atorvastatin without difficulties.  She says she did wear the cardiac monitor for 30 days and will be taking it in for interpretation tomorrow.  No known history of rhythm problems with her heart.  She does endorse a little bit of cognitive fogginess since the stroke.  She tells me she was trying to do some paperwork that would normally be easy to do but she was getting frustrated because she was having some trouble with this.  She asks if it is okay for her to drive.    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  She has an upcoming appointment for this in May.  She also has an upcoming appointment with her primary doctor who has provided refills of the atorvastatin and Eliquis.    Past Medical History:   Diagnosis Date     Asthma      Depression      Past Surgical History:   Procedure Laterality Date     LAPAROSCOPIC REMOVAL VAGAL NERVE BLOCKER  9/16/2013    Procedure: LAPAROSCOPIC REMOVAL VAGAL NERVE BLOCKER;  Laparoscopic Removal Of Vagal Nerve Leads And Subcutaneous Neuromodulator ;  Surgeon: Sigifredo Bernabe MD;  Location: [facility] OR     TUBAL LIGATION       uterine fibroids[       VBLOC[         albuterol (2.5 MG/3ML) 0.083% nebulizer solution, Take 1 ampule by nebulization every 6 hours as needed.  albuterol (PROAIR HFA) 108 (90 BASE) MCG/ACT inhaler, Inhale 2 puffs into the lungs every 6 hours as needed.  apixaban ANTICOAGULANT (ELIQUIS ANTICOAGULANT) 5 MG tablet, Take 5 mg by mouth  buPROPion (WELLBUTRIN XL) 150 MG 24 hr tablet, Take 150 mg by mouth daily as needed (fatigue)  gabapentin (NEURONTIN) 300 MG capsule, Take 300 mg by mouth 2 times daily  letrozole (FEMARA) 2.5 MG tablet, Take 2.5 mg by mouth daily  NONFORMULARY, Investigational drug name : ribociclib 200mg tablet     Drug study name : JOEL study    Take 400 mg  "by mouth daily. Take 2 tabs (400mg) by mouth daily in the morning with 8oz glass of water for 21 days.  Then off 7 days  potassium chloride ER (KLOR-CON M) 20 MEQ CR tablet, Take 20 mEq by mouth daily  traZODone (DESYREL) 100 MG tablet, Take 100 mg by mouth nightly as needed for sleep  venlafaxine (EFFEXOR-XR) 75 MG 24 hr capsule, Take 150 mg by mouth daily   atorvastatin (LIPITOR) 80 MG tablet, Take 1 tablet (80 mg) by mouth every evening    neostigmine (PROSTIGMINE) injection      Allergies   Allergen Reactions     Sulfa Drugs          Social History     Tobacco Use     Smoking status: Never Smoker     Smokeless tobacco: Never Used   Substance Use Topics     Alcohol use: Yes     Comment: Mild     Drug use: None       REVIEW OF SYSTEMS:                                                      10-point review of systems is negative except as mentioned above in HPI.     EXAM:                                                      Physical Exam:   Vitals: /78 (BP Location: Left arm, Patient Position: Sitting)   Pulse 108   Ht 1.626 m (5' 4\")   Wt 101 kg (222 lb 9.6 oz)   BMI 38.21 kg/m    BMI= Body mass index is 38.21 kg/m .  GENERAL: NAD.  HEENT: NC/AT.   CV: RRR. S1, S2.   NECK: No bruits.  PULM: Non-labored breathing.   Neurologic:  MENTAL STATUS: Alert, attentive. Speech is fluent. Normal comprehension. Normal concentration. Adequate fund of knowledge.   CRANIAL NERVES: Discs flat. Visual fields intact to confrontation. Pupils equally, round and reactive to light. Facial sensation and movement normal. EOM full. Hearing intact to conversation. Trapezius strength intact. Palate moves symmetrically. Tongue midline.  MOTOR: 5/5 in proximal and distal muscle groups of upper and lower extremities. Tone and bulk normal.   DTRs: Intact and symmetric in biceps, BR, patellae.  Babinski down-going bilaterally.   SENSATION: Normal light touch and pinprick. Intact proprioception at great toes. Vibration: Diminished at both " ankles (Lworse>R).  No neglect.  COORDINATION: Normal finger nose finger. Finger tapping normal. Knee heel shin normal.  STATION AND GAIT: Romberg negative. Good postural reflexes. Casual gait is normal.  Tandem minimally unsteady.  Right hand-dominant.    Relevant Data:  MRI Brain (2.16.22):  FINDINGS: Multiple sequences are degraded by patient motion.  INTRACRANIAL CONTENTS: There is patchy abnormal diffusion restriction centered within the right MCA territory involving the right temporal lobe, right insula, right parietal lobe, right posterior frontal lobe, and right corona radiata. There is   associated T2/FLAIR signal hyperintensity and gyral edema in these regions. There is mild associated effacement of the posterior body of the right lateral ventricle due to mass effect from the region of ischemic injury involving the right parietal lobe.   No significant midline shift is present. No associated hemorrhage is identified at this time. No mass, acute hemorrhage, or extra-axial fluid collections. Mild generalized cerebral atrophy. No hydrocephalus. Normal position of the cerebellar tonsils. No   pathologic contrast enhancement. Prominent collateral vessels are noted involving the right cerebral hemisphere.     SELLA: No abnormality accounting for technique.     OSSEOUS STRUCTURES/SOFT TISSUES: Normal marrow signal. The distal right M1 middle cerebral artery loses its normal flow-related signal, likely due to occlusion.      ORBITS: No abnormality accounting for technique.      SINUSES/MASTOIDS: No paranasal sinus mucosal disease. No middle ear or mastoid effusion.                                                                    IMPRESSION:  1.  Right MCA territory acute/subacute infarction without hemorrhagic conversion. Mild associated mass effect is present, however there is no midline shift. No associated hemorrhage.  2.  Additional findings above.    MRA Head/Neck (2.16.22):  FINDINGS:  ANTERIOR  CIRCULATION: There is a distal right M1 middle cerebral artery occlusion. No significant distal reconstitution is noted involving the inferior and posterior division right middle cerebral arteries. There is a single right anterior ascending   division MCA which demonstrates persistent flow-related signal. The right A1 anterior cerebral artery is aplastic. No TELMA or left MCA occlusion is identified. No aneurysm. No high flow vascular malformation. Standard [place] anatomy.     POSTERIOR CIRCULATION: No stenosis/occlusion, aneurysm, or high flow vascular malformation. Balanced vertebral arteries supply a normal basilar artery.                                                                    IMPRESSION:  1.  Distal right M1 middle cerebral artery occlusion, as above.    IMPRESSION:  1.  No high-grade stenosis or dissection.  2.  Mild diffuse narrowing of the right common carotid artery and right cervical ICA.  3.  Additional findings above.    CT/CTA Head/Neck (2.16.22):  IMPRESSION:  1.  Scattered evolving ischemic infarcts in the right middle cerebral artery distribution again noted. No definite new infarcts.  2.  Otherwise, normal head CT.    IMPRESSION:   1. Thromboembolism of the distal right M1 segment involving the  proximal M2 branches. This appears slightly more distal since MRA  2/16/2022 noting that comparison is difficult given differences in  technique.   2. No vascular cutoff of the proximal ACAs, left MCA, or PCAs.  3. Widely patent arteries in the neck without significant stenosis or  atherosclerotic disease.  4. 1.1 cm nodule at the visualized right lung apex. Unclear whether  this represents interstitial thickening versus a nodule. Consider  follow-up chest CT in 4-6 weeks.    Imaging reviewed independently by me.  Agree with radiology read.      ASSESSMENT and PLAN:                                                      Assessment:     ICD-10-CM    1. History of stroke  Z86.73 Adult Sleep  Eval & Management  Referral   2. 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 Snoring  R06.83 Adult Sleep Eval & Management  Referral        Ms. Atkinson is a pleasant 66-year-old woman here for follow-up after hospitalization for stroke.  Fortunately, her symptoms have resolved.  She is tolerating the Eliquis and atorvastatin without side effects.    We discussed Leydi's vascular risk factors.  It is reasonable to think that hypercoagulopathy in the setting of cancer history contributed to her stroke.  We discussed the results of the hypercoagulopathy labs which were normal/negative.  One issue that has not been addressed is possible sleep apnea.  I am referring Leydi on for sleep consultation regarding this.    I will follow up on the event monitor once those results are in.  In general, I think it would be fine if Leydi just follows up with her primary provider going forward.  We would be happy to see her back if any new concerns arise.  Leydi understands and agrees with the plan.    Plan:  -- Continue the Eliquis and the atorvastatin for future stroke prevention.  -- I will follow up on the cardiac monitor results in a few days and let you know if there are any concerns.  -- Follow-up with your primary provider next week as planned.  As long as they are comfortable I think you can continue to work with them from a vascular standpoint going forward.  -- Referral to Sleep clinic for consultation about possible sleep apnea.  -- We will plan to follow-up as needed.  I am happy to see you back if any new concerns arise.    Total Time: 45 minutes were spent with the patient and in chart review/documentation (as described above in Assessment and Plan) /coordinating the care on date of service.    Laney Brantley MD  Neurology    CC: Cristina Little MD    Dragon software used in the dictation of this note.

## 2022-03-24 NOTE — PATIENT INSTRUCTIONS
Plan:  -- Continue the Eliquis and the atorvastatin for future stroke prevention.  -- I will follow up on the cardiac monitor results in a few days and let you know if there are any concerns.  -- Follow-up with your primary provider next week as planned.  As long as they are comfortable I think you can continue to work with them from a vascular standpoint going forward.  -- Referral to Sleep clinic for consultation about possible sleep apnea.  -- We will plan to follow-up as needed.  I am happy to see you back if any new concerns arise.

## 2022-03-24 NOTE — Clinical Note
3/24/2022         RE: Leydi Atkinson  [address] [facility] [address]        Dear Colleague,    Thank you for referring your patient, Leydi Atkinson, to the [facility] [place]. Please see a copy of my visit note below.    INITIAL NEUROLOGY CONSULTATION    DATE OF VISIT: 3/24/2022  MRN: [id number]  PATIENT NAME: Leydi Atkinson  YOB: 1955    REFERRING PROVIDER: Referred Self    Chief Complaint   Patient presents with     Stroke     New patient- Hospital follow up (2/16/2022-2/20/2022)       SUBJECTIVE:                                                      HPI:   Leydi Atkinson is a 66 year old female self-referred to follow-up after stroke.  The patient was admitted to [facility] last month with new onset dysarthria, left facial droop.  History of PE and SVC in 4.2021, patient was on Xarelto daily.  She has additional history of hyperlipidemia and breast cancer (in remission).  MRI showed right MCA territory stroke, MRA with distal M1 occlusion and diffuse narrowing of her right carotid artery.  LDL was 113.  No evidence of metastasis on CT of the chest abdomen and pelvis.  TTE showed a positive PFO.  Hypercoagulopathy labs were sent: Lupus panel, factor II and V, Antithrombin III, anticardiolipin, beta 2 glycoprotein antibodies were all negative/normal.  The patient was switched from Xarelto to Eliquis and started on high-dose atorvastatin.  She was also discharged with cardiac monitoring, which is currently pending.      Discharge exam recorded with some visual neglect on the left and neglect to double simultaneous stimulation, mild left-sided weakness    Leydi tells me that she feels back to normal.  She is not having any problems with her vision, no weakness.  She does endorse some neuropathy symptoms in the feet and hands that have been present since chemotherapy.  Symptoms are stable with gabapentin.    I ask about how she felt initially, what led to  her presentation to the hospital.  She says she developed a headache, visual changes on the Left, and some chest pain that day.  She did not think much of these symptoms until she was talking to her daughter on the phone and they said she sounded drunk.  It was then that she sought medical care.    No problems with side effects on the Eliquis.  She is also tolerating the atorvastatin without difficulties.  She says she did wear the cardiac monitor for 30 days and will be taking it in for interpretation tomorrow.  No known history of rhythm problems with her heart.  She does endorse a little bit of cognitive fogginess since the stroke.  She tells me she was trying to do some paperwork that would normally be easy to do but she was getting frustrated because she was having some trouble with this.  She asks if it is okay for her to drive.    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  She has an upcoming appointment for this in May.  She also has an upcoming appointment with her primary doctor who has provided refills of the atorvastatin and Eliquis.    Past Medical History:   Diagnosis Date     Asthma      Depression      Past Surgical History:   Procedure Laterality Date     LAPAROSCOPIC REMOVAL VAGAL NERVE BLOCKER  9/16/2013    Procedure: LAPAROSCOPIC REMOVAL VAGAL NERVE BLOCKER;  Laparoscopic Removal Of Vagal Nerve Leads And Subcutaneous Neuromodulator ;  Surgeon: Sigifredo Bernabe MD;  Location: [facility] OR     TUBAL LIGATION       uterine fibroids[       VBLOC[         albuterol (2.5 MG/3ML) 0.083% nebulizer solution, Take 1 ampule by nebulization every 6 hours as needed.  albuterol (PROAIR HFA) 108 (90 BASE) MCG/ACT inhaler, Inhale 2 puffs into the lungs every 6 hours as needed.  apixaban ANTICOAGULANT (ELIQUIS ANTICOAGULANT) 5 MG tablet, Take 5 mg by mouth  buPROPion (WELLBUTRIN XL) 150 MG 24 hr tablet, Take 150 mg by mouth daily as needed  "(fatigue)  gabapentin (NEURONTIN) 300 MG capsule, Take 300 mg by mouth 2 times daily  letrozole (FEMARA) 2.5 MG tablet, Take 2.5 mg by mouth daily  NONFORMULARY, Investigational drug name : ribociclib 200mg tablet     Drug study name : JOEL study    Take 400 mg by mouth daily. Take 2 tabs (400mg) by mouth daily in the morning with 8oz glass of water for 21 days.  Then off 7 days  potassium chloride ER (KLOR-CON M) 20 MEQ CR tablet, Take 20 mEq by mouth daily  traZODone (DESYREL) 100 MG tablet, Take 100 mg by mouth nightly as needed for sleep  venlafaxine (EFFEXOR-XR) 75 MG 24 hr capsule, Take 150 mg by mouth daily   atorvastatin (LIPITOR) 80 MG tablet, Take 1 tablet (80 mg) by mouth every evening    neostigmine (PROSTIGMINE) injection      Allergies   Allergen Reactions     Sulfa Drugs          Social History     Tobacco Use     Smoking status: Never Smoker     Smokeless tobacco: Never Used   Substance Use Topics     Alcohol use: Yes     Comment: Mild     Drug use: None       REVIEW OF SYSTEMS:                                                      10-point review of systems is negative except as mentioned above in HPI.     EXAM:                                                      Physical Exam:   Vitals: /78 (BP Location: Left arm, Patient Position: Sitting)   Pulse 108   Ht 1.626 m (5' 4\")   Wt 101 kg (222 lb 9.6 oz)   BMI 38.21 kg/m    BMI= Body mass index is 38.21 kg/m .  GENERAL: NAD.  HEENT: NC/AT.   CV: RRR. S1, S2.   NECK: No bruits.  PULM: Non-labored breathing.   Neurologic:  MENTAL STATUS: Alert, attentive. Speech is fluent. Normal comprehension. Normal concentration. Adequate fund of knowledge.   CRANIAL NERVES: Discs flat. Visual fields intact to confrontation. Pupils equally, round and reactive to light. Facial sensation and movement normal. EOM full. Hearing intact to conversation. Trapezius strength intact. Palate moves symmetrically. Tongue midline.  MOTOR: 5/5 in proximal and distal " muscle groups of upper and lower extremities. Tone and bulk normal.   DTRs: Intact and symmetric in biceps, BR, patellae.  Babinski down-going bilaterally.   SENSATION: Normal light touch and pinprick. Intact proprioception at great toes. Vibration: Diminished at both ankles (Lworse>R).  No neglect.  COORDINATION: Normal finger nose finger. Finger tapping normal. Knee heel shin normal.  STATION AND GAIT: Romberg negative. Good postural reflexes. Casual gait is normal.  Tandem minimally unsteady.  Right hand-dominant.    Relevant Data:  MRI Brain (2.16.22):  FINDINGS: Multiple sequences are degraded by patient motion.  INTRACRANIAL CONTENTS: There is patchy abnormal diffusion restriction centered within the right MCA territory involving the right temporal lobe, right insula, right parietal lobe, right posterior frontal lobe, and right corona radiata. There is   associated T2/FLAIR signal hyperintensity and gyral edema in these regions. There is mild associated effacement of the posterior body of the right lateral ventricle due to mass effect from the region of ischemic injury involving the right parietal lobe.   No significant midline shift is present. No associated hemorrhage is identified at this time. No mass, acute hemorrhage, or extra-axial fluid collections. Mild generalized cerebral atrophy. No hydrocephalus. Normal position of the cerebellar tonsils. No   pathologic contrast enhancement. Prominent collateral vessels are noted involving the right cerebral hemisphere.     SELLA: No abnormality accounting for technique.     OSSEOUS STRUCTURES/SOFT TISSUES: Normal marrow signal. The distal right M1 middle cerebral artery loses its normal flow-related signal, likely due to occlusion.      ORBITS: No abnormality accounting for technique.      SINUSES/MASTOIDS: No paranasal sinus mucosal disease. No middle ear or mastoid effusion.                                                                    IMPRESSION:  1.   Right MCA territory acute/subacute infarction without hemorrhagic conversion. Mild associated mass effect is present, however there is no midline shift. No associated hemorrhage.  2.  Additional findings above.    MRA Head/Neck (2.16.22):  FINDINGS:  ANTERIOR CIRCULATION: There is a distal right M1 middle cerebral artery occlusion. No significant distal reconstitution is noted involving the inferior and posterior division right middle cerebral arteries. There is a single right anterior ascending   division MCA which demonstrates persistent flow-related signal. The right A1 anterior cerebral artery is aplastic. No TELMA or left MCA occlusion is identified. No aneurysm. No high flow vascular malformation. Standard [place] anatomy.     POSTERIOR CIRCULATION: No stenosis/occlusion, aneurysm, or high flow vascular malformation. Balanced vertebral arteries supply a normal basilar artery.                                                                    IMPRESSION:  1.  Distal right M1 middle cerebral artery occlusion, as above.    IMPRESSION:  1.  No high-grade stenosis or dissection.  2.  Mild diffuse narrowing of the right common carotid artery and right cervical ICA.  3.  Additional findings above.    CT/CTA Head/Neck (2.16.22):  IMPRESSION:  1.  Scattered evolving ischemic infarcts in the right middle cerebral artery distribution again noted. No definite new infarcts.  2.  Otherwise, normal head CT.    IMPRESSION:   1. Thromboembolism of the distal right M1 segment involving the  proximal M2 branches. This appears slightly more distal since MRA  2/16/2022 noting that comparison is difficult given differences in  technique.   2. No vascular cutoff of the proximal ACAs, left MCA, or PCAs.  3. Widely patent arteries in the neck without significant stenosis or  atherosclerotic disease.  4. 1.1 cm nodule at the visualized right lung apex. Unclear whether  this represents interstitial thickening versus a nodule.  Consider  follow-up chest CT in 4-6 weeks.    Imaging reviewed independently by me.  Agree with radiology read.      ASSESSMENT and PLAN:                                                      Assessment:     ICD-10-CM    1. History of stroke  Z86.73 Adult Sleep Eval & Management  Referral   2. 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 Snoring  R06.83 Adult Sleep Eval & Management  Referral        Ms. Atkinson is a pleasant 66-year-old woman here for follow-up after hospitalization for stroke.  Fortunately, her symptoms have resolved.  She is tolerating the Eliquis and atorvastatin without side effects.    We discussed Leydi's vascular risk factors.  It is reasonable to think that hypercoagulopathy in the setting of cancer history contributed to her stroke.  We discussed the results of the hypercoagulopathy labs which were normal/negative.  One issue that has not been addressed is possible sleep apnea.  I am referring Leydi on for sleep consultation regarding this.    I will follow up on the event monitor once those results are in.  In general, I think it would be fine if Leydi just follows up with her primary provider going forward.  We would be happy to see her back if any new concerns arise.  Leydi understands and agrees with the plan.    Plan:  -- Continue the Eliquis and the atorvastatin for future stroke prevention.  -- I will follow up on the cardiac monitor results in a few days and let you know if there are any concerns.  -- Follow-up with your primary provider next week as planned.  As long as they are comfortable I think you can continue to work with them from a vascular standpoint going forward.  -- Referral to Sleep clinic for consultation about possible sleep apnea.  -- We will plan to follow-up as needed.  I am happy to see you back if any new concerns arise.    Total Time: 45 minutes were spent with the patient and in chart review/documentation (as described  above in Assessment and Plan) /coordinating the care on date of service.    Laney Brantley MD  Neurology    CC: MD Laura Mayen software used in the dictation of this note.        Again, thank you for allowing me to participate in the care of your patient.        Sincerely,        Laney Brantley MD

## 2022-03-26 NOTE — RESULT ENCOUNTER NOTE
Please let Leydi know that there is no evidence of atrial fibrillation on her cardiac monitoring.  No change in plan in terms of stroke risk management going forward.    Thank you,Dr. Brantley

## 2022-04-09 ENCOUNTER — HEALTH MAINTENANCE LETTER (OUTPATIENT)
Age: 67
End: 2022-04-09

## 2022-10-09 ENCOUNTER — HEALTH MAINTENANCE LETTER (OUTPATIENT)
Age: 67
End: 2022-10-09

## 2023-10-28 ENCOUNTER — HEALTH MAINTENANCE LETTER (OUTPATIENT)
Age: 68
End: 2023-10-28

## 2024-12-21 ENCOUNTER — HEALTH MAINTENANCE LETTER (OUTPATIENT)
Age: 69
End: 2024-12-21